# Patient Record
Sex: FEMALE | Race: BLACK OR AFRICAN AMERICAN | NOT HISPANIC OR LATINO | Employment: OTHER | URBAN - METROPOLITAN AREA
[De-identification: names, ages, dates, MRNs, and addresses within clinical notes are randomized per-mention and may not be internally consistent; named-entity substitution may affect disease eponyms.]

---

## 2023-12-07 ENCOUNTER — HOSPITAL ENCOUNTER (INPATIENT)
Facility: HOSPITAL | Age: 78
LOS: 6 days | Discharge: HOME | DRG: 871 | End: 2023-12-13
Attending: EMERGENCY MEDICINE | Admitting: INTERNAL MEDICINE
Payer: MEDICARE

## 2023-12-07 ENCOUNTER — APPOINTMENT (OUTPATIENT)
Dept: GENERAL RADIOLOGY | Facility: HOSPITAL | Age: 78
DRG: 871 | End: 2023-12-07
Payer: MEDICARE

## 2023-12-07 DIAGNOSIS — J96.01 ACUTE HYPOXEMIC RESPIRATORY FAILURE DUE TO COVID-19: ICD-10-CM

## 2023-12-07 DIAGNOSIS — U07.1 ACUTE HYPOXEMIC RESPIRATORY FAILURE DUE TO COVID-19: ICD-10-CM

## 2023-12-07 DIAGNOSIS — R06.89 HYPERCAPNIA: ICD-10-CM

## 2023-12-07 DIAGNOSIS — R53.83 FATIGUE, UNSPECIFIED TYPE: ICD-10-CM

## 2023-12-07 DIAGNOSIS — R09.02 HYPOXIA: ICD-10-CM

## 2023-12-07 DIAGNOSIS — J96.02 ACUTE HYPERCAPNIC RESPIRATORY FAILURE: ICD-10-CM

## 2023-12-07 DIAGNOSIS — U07.1 COVID-19: Primary | ICD-10-CM

## 2023-12-07 PROBLEM — R79.89 ELEVATED BRAIN NATRIURETIC PEPTIDE (BNP) LEVEL: Status: ACTIVE | Noted: 2023-12-07

## 2023-12-07 PROBLEM — E11.9 TYPE 2 DIABETES MELLITUS: Status: ACTIVE | Noted: 2023-12-07

## 2023-12-07 PROBLEM — N17.9 AKI (ACUTE KIDNEY INJURY): Status: ACTIVE | Noted: 2023-12-07

## 2023-12-07 PROBLEM — J96.90 RESPIRATORY FAILURE: Status: ACTIVE | Noted: 2023-12-07

## 2023-12-07 PROBLEM — D64.9 ANEMIA: Chronic | Status: ACTIVE | Noted: 2023-12-07

## 2023-12-07 PROBLEM — J96.90 RESPIRATORY FAILURE: Status: RESOLVED | Noted: 2023-12-07 | Resolved: 2023-12-07

## 2023-12-07 PROBLEM — A41.9 SEPSIS: Status: ACTIVE | Noted: 2023-12-07

## 2023-12-07 LAB
ALBUMIN SERPL-MCNC: 3.7 G/DL (ref 3.5–5.2)
ALBUMIN/GLOB SERPL: 2.1 G/DL
ALP SERPL-CCNC: 84 U/L (ref 39–117)
ALT SERPL W P-5'-P-CCNC: 7 U/L (ref 1–33)
ANION GAP SERPL CALCULATED.3IONS-SCNC: 10 MMOL/L (ref 5–15)
ARTERIAL PATENCY WRIST A: ABNORMAL
ARTERIAL PATENCY WRIST A: ABNORMAL
AST SERPL-CCNC: 18 U/L (ref 1–32)
ATMOSPHERIC PRESS: ABNORMAL MM[HG]
ATMOSPHERIC PRESS: ABNORMAL MM[HG]
B PARAPERT DNA SPEC QL NAA+PROBE: NOT DETECTED
B PERT DNA SPEC QL NAA+PROBE: NOT DETECTED
BASE EXCESS BLDA CALC-SCNC: 10 MMOL/L (ref 0–2)
BASE EXCESS BLDA CALC-SCNC: 10.4 MMOL/L (ref 0–2)
BASOPHILS # BLD AUTO: 0.01 10*3/MM3 (ref 0–0.2)
BASOPHILS NFR BLD AUTO: 0.2 % (ref 0–1.5)
BDY SITE: ABNORMAL
BDY SITE: ABNORMAL
BILIRUB SERPL-MCNC: 0.3 MG/DL (ref 0–1.2)
BODY TEMPERATURE: 37
BODY TEMPERATURE: 37
BUN SERPL-MCNC: 15 MG/DL (ref 8–23)
BUN/CREAT SERPL: 12.3 (ref 7–25)
C PNEUM DNA NPH QL NAA+NON-PROBE: NOT DETECTED
CALCIUM SPEC-SCNC: 9 MG/DL (ref 8.6–10.5)
CHLORIDE SERPL-SCNC: 99 MMOL/L (ref 98–107)
CO2 BLDA-SCNC: 39.6 MMOL/L (ref 22–33)
CO2 BLDA-SCNC: 40 MMOL/L (ref 22–33)
CO2 SERPL-SCNC: 34 MMOL/L (ref 22–29)
COHGB MFR BLD: 1.4 % (ref 0–2)
COHGB MFR BLD: 1.5 % (ref 0–2)
CREAT SERPL-MCNC: 1.22 MG/DL (ref 0.57–1)
CRP SERPL-MCNC: 4.79 MG/DL (ref 0–0.5)
D-LACTATE SERPL-SCNC: 1.1 MMOL/L (ref 0.5–2)
D-LACTATE SERPL-SCNC: 2.3 MMOL/L (ref 0.5–2)
DEPRECATED RDW RBC AUTO: 54.3 FL (ref 37–54)
EGFRCR SERPLBLD CKD-EPI 2021: 45.5 ML/MIN/1.73
EOSINOPHIL # BLD AUTO: 0 10*3/MM3 (ref 0–0.4)
EOSINOPHIL NFR BLD AUTO: 0 % (ref 0.3–6.2)
EPAP: 0
EPAP: 0
ERYTHROCYTE [DISTWIDTH] IN BLOOD BY AUTOMATED COUNT: 14.8 % (ref 12.3–15.4)
FLUAV SUBTYP SPEC NAA+PROBE: NOT DETECTED
FLUBV RNA ISLT QL NAA+PROBE: NOT DETECTED
GEN 5 2HR TROPONIN T REFLEX: 80 NG/L
GLOBULIN UR ELPH-MCNC: 1.8 GM/DL
GLUCOSE BLDC GLUCOMTR-MCNC: 113 MG/DL (ref 70–130)
GLUCOSE SERPL-MCNC: 128 MG/DL (ref 65–99)
HADV DNA SPEC NAA+PROBE: NOT DETECTED
HCO3 BLDA-SCNC: 37.5 MMOL/L (ref 20–26)
HCO3 BLDA-SCNC: 37.8 MMOL/L (ref 20–26)
HCOV 229E RNA SPEC QL NAA+PROBE: NOT DETECTED
HCOV HKU1 RNA SPEC QL NAA+PROBE: NOT DETECTED
HCOV NL63 RNA SPEC QL NAA+PROBE: NOT DETECTED
HCOV OC43 RNA SPEC QL NAA+PROBE: NOT DETECTED
HCT VFR BLD AUTO: 29.5 % (ref 34–46.6)
HCT VFR BLD CALC: 26.3 % (ref 38–51)
HCT VFR BLD CALC: 26.7 % (ref 38–51)
HGB BLD-MCNC: 8.5 G/DL (ref 12–15.9)
HGB BLDA-MCNC: 8.6 G/DL (ref 14–18)
HGB BLDA-MCNC: 8.7 G/DL (ref 14–18)
HMPV RNA NPH QL NAA+NON-PROBE: NOT DETECTED
HOLD SPECIMEN: NORMAL
HPIV1 RNA ISLT QL NAA+PROBE: NOT DETECTED
HPIV2 RNA SPEC QL NAA+PROBE: NOT DETECTED
HPIV3 RNA NPH QL NAA+PROBE: NOT DETECTED
HPIV4 P GENE NPH QL NAA+PROBE: NOT DETECTED
IMM GRANULOCYTES # BLD AUTO: 0.05 10*3/MM3 (ref 0–0.05)
IMM GRANULOCYTES NFR BLD AUTO: 0.8 % (ref 0–0.5)
INHALED O2 CONCENTRATION: 28 %
INHALED O2 CONCENTRATION: 40 %
IPAP: 0
IPAP: 0
LYMPHOCYTES # BLD AUTO: 1.05 10*3/MM3 (ref 0.7–3.1)
LYMPHOCYTES NFR BLD AUTO: 16.6 % (ref 19.6–45.3)
Lab: ABNORMAL
M PNEUMO IGG SER IA-ACNC: NOT DETECTED
MAGNESIUM SERPL-MCNC: 1.7 MG/DL (ref 1.6–2.4)
MCH RBC QN AUTO: 28.8 PG (ref 26.6–33)
MCHC RBC AUTO-ENTMCNC: 28.8 G/DL (ref 31.5–35.7)
MCV RBC AUTO: 100 FL (ref 79–97)
METHGB BLD QL: 0.3 % (ref 0–1.5)
METHGB BLD QL: 0.4 % (ref 0–1.5)
MODALITY: ABNORMAL
MODALITY: ABNORMAL
MONOCYTES # BLD AUTO: 1.13 10*3/MM3 (ref 0.1–0.9)
MONOCYTES NFR BLD AUTO: 17.8 % (ref 5–12)
NEUTROPHILS NFR BLD AUTO: 4.1 10*3/MM3 (ref 1.7–7)
NEUTROPHILS NFR BLD AUTO: 64.6 % (ref 42.7–76)
NOTIFIED BY: ABNORMAL
NOTIFIED WHO: ABNORMAL
NRBC BLD AUTO-RTO: 0 /100 WBC (ref 0–0.2)
NT-PROBNP SERPL-MCNC: 3719 PG/ML (ref 0–1800)
OXYHGB MFR BLDV: 68.9 % (ref 94–99)
OXYHGB MFR BLDV: 97.6 % (ref 94–99)
PAW @ PEAK INSP FLOW SETTING VENT: 0 CMH2O
PAW @ PEAK INSP FLOW SETTING VENT: 0 CMH2O
PCO2 BLDA: 66.8 MM HG (ref 35–45)
PCO2 BLDA: 73 MM HG (ref 35–45)
PCO2 TEMP ADJ BLD: 66.8 MM HG (ref 35–45)
PCO2 TEMP ADJ BLD: 73 MM HG (ref 35–45)
PH BLDA: 7.32 PH UNITS (ref 7.35–7.45)
PH BLDA: 7.36 PH UNITS (ref 7.35–7.45)
PH, TEMP CORRECTED: 7.32 PH UNITS
PH, TEMP CORRECTED: 7.36 PH UNITS
PLATELET # BLD AUTO: 211 10*3/MM3 (ref 140–450)
PMV BLD AUTO: 10.9 FL (ref 6–12)
PO2 BLDA: 119 MM HG (ref 83–108)
PO2 BLDA: 42.4 MM HG (ref 83–108)
PO2 TEMP ADJ BLD: 119 MM HG (ref 83–108)
PO2 TEMP ADJ BLD: 42.4 MM HG (ref 83–108)
POTASSIUM SERPL-SCNC: 4.1 MMOL/L (ref 3.5–5.2)
PROCALCITONIN SERPL-MCNC: 0.23 NG/ML (ref 0–0.25)
PROT SERPL-MCNC: 5.5 G/DL (ref 6–8.5)
RBC # BLD AUTO: 2.95 10*6/MM3 (ref 3.77–5.28)
RHINOVIRUS RNA SPEC NAA+PROBE: NOT DETECTED
RSV RNA NPH QL NAA+NON-PROBE: NOT DETECTED
SARS-COV-2 RNA NPH QL NAA+NON-PROBE: DETECTED
SODIUM SERPL-SCNC: 143 MMOL/L (ref 136–145)
TOTAL RATE: 0 BREATHS/MINUTE
TOTAL RATE: 0 BREATHS/MINUTE
TROPONIN T DELTA: -2 NG/L
TROPONIN T SERPL HS-MCNC: 82 NG/L
WBC NRBC COR # BLD AUTO: 6.34 10*3/MM3 (ref 3.4–10.8)
WHOLE BLOOD HOLD COAG: NORMAL
WHOLE BLOOD HOLD SPECIMEN: NORMAL

## 2023-12-07 PROCEDURE — 36600 WITHDRAWAL OF ARTERIAL BLOOD: CPT

## 2023-12-07 PROCEDURE — 0202U NFCT DS 22 TRGT SARS-COV-2: CPT | Performed by: EMERGENCY MEDICINE

## 2023-12-07 PROCEDURE — 87040 BLOOD CULTURE FOR BACTERIA: CPT | Performed by: EMERGENCY MEDICINE

## 2023-12-07 PROCEDURE — 25010000002 REMDESIVIR 100 MG/20ML SOLUTION 1 EACH VIAL

## 2023-12-07 PROCEDURE — 94799 UNLISTED PULMONARY SVC/PX: CPT

## 2023-12-07 PROCEDURE — 71045 X-RAY EXAM CHEST 1 VIEW: CPT

## 2023-12-07 PROCEDURE — 94660 CPAP INITIATION&MGMT: CPT

## 2023-12-07 PROCEDURE — 83050 HGB METHEMOGLOBIN QUAN: CPT

## 2023-12-07 PROCEDURE — 82948 REAGENT STRIP/BLOOD GLUCOSE: CPT

## 2023-12-07 PROCEDURE — 85025 COMPLETE CBC W/AUTO DIFF WBC: CPT | Performed by: EMERGENCY MEDICINE

## 2023-12-07 PROCEDURE — 82805 BLOOD GASES W/O2 SATURATION: CPT

## 2023-12-07 PROCEDURE — 84484 ASSAY OF TROPONIN QUANT: CPT | Performed by: EMERGENCY MEDICINE

## 2023-12-07 PROCEDURE — 25010000002 FUROSEMIDE PER 20 MG: Performed by: EMERGENCY MEDICINE

## 2023-12-07 PROCEDURE — 25810000003 SODIUM CHLORIDE 0.9 % SOLUTION 250 ML FLEX CONT

## 2023-12-07 PROCEDURE — 99291 CRITICAL CARE FIRST HOUR: CPT | Performed by: INTERNAL MEDICINE

## 2023-12-07 PROCEDURE — XW033E5 INTRODUCTION OF REMDESIVIR ANTI-INFECTIVE INTO PERIPHERAL VEIN, PERCUTANEOUS APPROACH, NEW TECHNOLOGY GROUP 5: ICD-10-PCS | Performed by: INTERNAL MEDICINE

## 2023-12-07 PROCEDURE — 99285 EMERGENCY DEPT VISIT HI MDM: CPT

## 2023-12-07 PROCEDURE — 25010000002 DEXAMETHASONE PER 1 MG: Performed by: INTERNAL MEDICINE

## 2023-12-07 PROCEDURE — 83735 ASSAY OF MAGNESIUM: CPT | Performed by: INTERNAL MEDICINE

## 2023-12-07 PROCEDURE — 83605 ASSAY OF LACTIC ACID: CPT | Performed by: EMERGENCY MEDICINE

## 2023-12-07 PROCEDURE — 93005 ELECTROCARDIOGRAM TRACING: CPT | Performed by: EMERGENCY MEDICINE

## 2023-12-07 PROCEDURE — 86140 C-REACTIVE PROTEIN: CPT | Performed by: INTERNAL MEDICINE

## 2023-12-07 PROCEDURE — 80053 COMPREHEN METABOLIC PANEL: CPT | Performed by: EMERGENCY MEDICINE

## 2023-12-07 PROCEDURE — 84145 PROCALCITONIN (PCT): CPT | Performed by: INTERNAL MEDICINE

## 2023-12-07 PROCEDURE — 82375 ASSAY CARBOXYHB QUANT: CPT

## 2023-12-07 PROCEDURE — 83880 ASSAY OF NATRIURETIC PEPTIDE: CPT | Performed by: EMERGENCY MEDICINE

## 2023-12-07 PROCEDURE — 36415 COLL VENOUS BLD VENIPUNCTURE: CPT

## 2023-12-07 PROCEDURE — 87641 MR-STAPH DNA AMP PROBE: CPT | Performed by: NURSE PRACTITIONER

## 2023-12-07 RX ORDER — GABAPENTIN 300 MG/1
1 CAPSULE ORAL 2 TIMES DAILY
COMMUNITY

## 2023-12-07 RX ORDER — POLYETHYLENE GLYCOL 3350 17 G/17G
17 POWDER, FOR SOLUTION ORAL DAILY PRN
COMMUNITY

## 2023-12-07 RX ORDER — DEXAMETHASONE SODIUM PHOSPHATE 4 MG/ML
6 INJECTION, SOLUTION INTRA-ARTICULAR; INTRALESIONAL; INTRAMUSCULAR; INTRAVENOUS; SOFT TISSUE DAILY
Status: DISCONTINUED | OUTPATIENT
Start: 2023-12-07 | End: 2023-12-13 | Stop reason: HOSPADM

## 2023-12-07 RX ORDER — FUROSEMIDE 40 MG/1
1 TABLET ORAL DAILY
COMMUNITY

## 2023-12-07 RX ORDER — SODIUM CHLORIDE 9 MG/ML
40 INJECTION, SOLUTION INTRAVENOUS AS NEEDED
Status: DISCONTINUED | OUTPATIENT
Start: 2023-12-07 | End: 2023-12-13 | Stop reason: HOSPADM

## 2023-12-07 RX ORDER — LEVOTHYROXINE SODIUM 0.05 MG/1
1 TABLET ORAL DAILY
COMMUNITY

## 2023-12-07 RX ORDER — POLYETHYLENE GLYCOL 3350 17 G/17G
17 POWDER, FOR SOLUTION ORAL DAILY PRN
Status: DISCONTINUED | OUTPATIENT
Start: 2023-12-07 | End: 2023-12-13 | Stop reason: HOSPADM

## 2023-12-07 RX ORDER — GABAPENTIN 300 MG/1
300 CAPSULE ORAL EVERY 12 HOURS SCHEDULED
Status: DISCONTINUED | OUTPATIENT
Start: 2023-12-07 | End: 2023-12-13 | Stop reason: HOSPADM

## 2023-12-07 RX ORDER — IBUPROFEN 600 MG/1
1 TABLET ORAL
Status: DISCONTINUED | OUTPATIENT
Start: 2023-12-07 | End: 2023-12-13 | Stop reason: HOSPADM

## 2023-12-07 RX ORDER — DEXTROSE MONOHYDRATE 25 G/50ML
25 INJECTION, SOLUTION INTRAVENOUS
Status: DISCONTINUED | OUTPATIENT
Start: 2023-12-07 | End: 2023-12-13 | Stop reason: HOSPADM

## 2023-12-07 RX ORDER — LEVOTHYROXINE SODIUM 0.05 MG/1
50 TABLET ORAL
Status: DISCONTINUED | OUTPATIENT
Start: 2023-12-08 | End: 2023-12-13 | Stop reason: HOSPADM

## 2023-12-07 RX ORDER — DOCUSATE SODIUM 100 MG/1
1 CAPSULE, LIQUID FILLED ORAL 2 TIMES DAILY PRN
COMMUNITY

## 2023-12-07 RX ORDER — LISINOPRIL 40 MG/1
1 TABLET ORAL DAILY
COMMUNITY

## 2023-12-07 RX ORDER — SODIUM CHLORIDE 0.9 % (FLUSH) 0.9 %
10 SYRINGE (ML) INJECTION AS NEEDED
Status: DISCONTINUED | OUTPATIENT
Start: 2023-12-07 | End: 2023-12-08

## 2023-12-07 RX ORDER — BISACODYL 10 MG
10 SUPPOSITORY, RECTAL RECTAL DAILY PRN
Status: DISCONTINUED | OUTPATIENT
Start: 2023-12-07 | End: 2023-12-13 | Stop reason: HOSPADM

## 2023-12-07 RX ORDER — SODIUM CHLORIDE 0.9 % (FLUSH) 0.9 %
10 SYRINGE (ML) INJECTION AS NEEDED
Status: DISCONTINUED | OUTPATIENT
Start: 2023-12-07 | End: 2023-12-13 | Stop reason: HOSPADM

## 2023-12-07 RX ORDER — BISACODYL 5 MG/1
5 TABLET, DELAYED RELEASE ORAL DAILY PRN
Status: DISCONTINUED | OUTPATIENT
Start: 2023-12-07 | End: 2023-12-13 | Stop reason: HOSPADM

## 2023-12-07 RX ORDER — FUROSEMIDE 10 MG/ML
80 INJECTION INTRAMUSCULAR; INTRAVENOUS ONCE
Status: COMPLETED | OUTPATIENT
Start: 2023-12-07 | End: 2023-12-07

## 2023-12-07 RX ORDER — ACETAMINOPHEN 325 MG/1
2 TABLET ORAL EVERY 6 HOURS PRN
COMMUNITY

## 2023-12-07 RX ORDER — NYSTATIN 100000 [USP'U]/G
POWDER TOPICAL
COMMUNITY

## 2023-12-07 RX ORDER — AMOXICILLIN 250 MG
2 CAPSULE ORAL 2 TIMES DAILY
Status: DISCONTINUED | OUTPATIENT
Start: 2023-12-07 | End: 2023-12-13 | Stop reason: HOSPADM

## 2023-12-07 RX ORDER — NICOTINE POLACRILEX 4 MG
15 LOZENGE BUCCAL
Status: DISCONTINUED | OUTPATIENT
Start: 2023-12-07 | End: 2023-12-13 | Stop reason: HOSPADM

## 2023-12-07 RX ORDER — AMLODIPINE BESYLATE 10 MG/1
1 TABLET ORAL DAILY
COMMUNITY

## 2023-12-07 RX ORDER — SODIUM CHLORIDE 0.9 % (FLUSH) 0.9 %
10 SYRINGE (ML) INJECTION EVERY 12 HOURS SCHEDULED
Status: DISCONTINUED | OUTPATIENT
Start: 2023-12-07 | End: 2023-12-13 | Stop reason: HOSPADM

## 2023-12-07 RX ORDER — LANOLIN ALCOHOL/MO/W.PET/CERES
1 CREAM (GRAM) TOPICAL NIGHTLY PRN
COMMUNITY

## 2023-12-07 RX ORDER — ATORVASTATIN CALCIUM 20 MG/1
1 TABLET, FILM COATED ORAL DAILY
COMMUNITY

## 2023-12-07 RX ORDER — NITROGLYCERIN 0.4 MG/1
0.4 TABLET SUBLINGUAL
Status: DISCONTINUED | OUTPATIENT
Start: 2023-12-07 | End: 2023-12-13 | Stop reason: HOSPADM

## 2023-12-07 RX ADMIN — FUROSEMIDE 80 MG: 10 INJECTION, SOLUTION INTRAMUSCULAR; INTRAVENOUS at 18:48

## 2023-12-07 RX ADMIN — Medication 10 ML: at 20:59

## 2023-12-07 RX ADMIN — DEXAMETHASONE SODIUM PHOSPHATE 6 MG: 4 INJECTION INTRA-ARTICULAR; INTRALESIONAL; INTRAMUSCULAR; INTRAVENOUS; SOFT TISSUE at 18:48

## 2023-12-07 RX ADMIN — REMDESIVIR 200 MG: 100 INJECTION, POWDER, LYOPHILIZED, FOR SOLUTION INTRAVENOUS at 22:47

## 2023-12-07 NOTE — Clinical Note
Level of Care: Critical Care [6]   Diagnosis: Respiratory failure [282127]   Certification: I Certify That Inpatient Hospital Services Are Medically Necessary For Greater Than 2 Midnights

## 2023-12-07 NOTE — ED PROVIDER NOTES
Subjective   History of Present Illness  78-year-old female who presents for evaluation of weakness, fever, and low oxygen saturations.  The patient reportedly was noted to be more confused and weak than her typical baseline today.  Presents from the [facility] [place].  She typically ambulates on her own but today she required 2 people to assist her and could barely stand.  She reports feeling much weaker than her typical self and exhibits baseline mentation but appears lethargic.  She primarily complains of feeling weak but truly denies shortness of breath.  She denies chest pain or abdominal pain.  She does report that several of the nursing staff at the facility have been sick with COVID-19 and she was diagnosed as being positive for COVID-19 today.  At 8:30 AM this morning she had a temperature of 100.6, then at 1240 she had a fever greater than 102.  She was given 1 g of Tylenol that given time.  She has been delivered here due to low oxygen saturations and worsening fatigue.  She denies dysuria, frequency, urgency.  No change in bowel function clued no diarrhea or blood in stool.  No other acute complaints.      Review of Systems   Constitutional:  Positive for activity change, appetite change, chills, diaphoresis and fever. Negative for fatigue.   HENT:  Negative for congestion, ear pain, postnasal drip, sinus pressure and sore throat.    Eyes:  Negative for pain, redness and visual disturbance.   Respiratory:  Negative for cough, chest tightness and shortness of breath.    Cardiovascular:  Negative for chest pain, palpitations and leg swelling.   Gastrointestinal:  Negative for abdominal pain, anal bleeding, blood in stool, diarrhea, nausea and vomiting.   Endocrine: Negative for polydipsia and polyuria.   Genitourinary:  Negative for difficulty urinating, dysuria, frequency and urgency.   Musculoskeletal:  Negative for arthralgias, back pain and neck pain.   Skin:  Negative for pallor and rash.    Allergic/Immunologic: Negative for environmental allergies and immunocompromised state.   Neurological:  Positive for weakness (generalized). Negative for dizziness and headaches.   Hematological:  Negative for adenopathy.   Psychiatric/Behavioral:  Negative for confusion, self-injury and suicidal ideas. The patient is not nervous/anxious.    All other systems reviewed and are negative.      Past Medical History:   Diagnosis Date    Diabetes mellitus     Hypertension     Ischemia of small intestine        Allergies   Allergen Reactions    Penicillins Other (See Comments)     UNKNOWN       History reviewed. No pertinent surgical history.    History reviewed. No pertinent family history.    Social History     Socioeconomic History    Marital status:    Tobacco Use    Smoking status: Never     Passive exposure: Never    Smokeless tobacco: Never   Vaping Use    Vaping Use: Never used   Substance and Sexual Activity    Alcohol use: Never    Drug use: Never    Sexual activity: Not Currently           Objective   Physical Exam  Vitals and nursing note reviewed.   Constitutional:       General: She is in acute distress.      Appearance: Normal appearance. She is well-developed. She is ill-appearing. She is not toxic-appearing or diaphoretic.   HENT:      Head: Normocephalic and atraumatic.      Right Ear: External ear normal.      Left Ear: External ear normal.      Nose: Nose normal.   Eyes:      General: Lids are normal.      Pupils: Pupils are equal, round, and reactive to light.   Neck:      Trachea: No tracheal deviation.   Cardiovascular:      Rate and Rhythm: Normal rate and regular rhythm.      Pulses: No decreased pulses.      Heart sounds: Normal heart sounds. No murmur heard.     No friction rub. No gallop.   Pulmonary:      Effort: Tachypnea and respiratory distress present.      Breath sounds: Normal breath sounds. Examination of the right-lower field reveals rales. Examination of the left-lower field  reveals rales. No decreased breath sounds, wheezing, rhonchi or rales.   Abdominal:      General: Bowel sounds are normal.      Palpations: Abdomen is soft.      Tenderness: There is no abdominal tenderness. There is no guarding or rebound.   Musculoskeletal:         General: No deformity. Normal range of motion.      Cervical back: Normal range of motion and neck supple.      Right lower le+ Pitting Edema present.      Left lower leg: Pitting Edema present.   Lymphadenopathy:      Cervical: No cervical adenopathy.   Skin:     General: Skin is warm and dry.      Findings: No rash.   Neurological:      Mental Status: She is alert and oriented to person, place, and time.      Cranial Nerves: No cranial nerve deficit.      Sensory: No sensory deficit.   Psychiatric:         Speech: Speech normal.         Behavior: Behavior normal.         Thought Content: Thought content normal.         Judgment: Judgment normal.         Critical Care    Performed by: Nicky Smith MD  Authorized by: Nicky Smith MD    Critical care provider statement:     Critical care time (minutes):  45    Critical care time was exclusive of:  Separately billable procedures and treating other patients    Critical care was necessary to treat or prevent imminent or life-threatening deterioration of the following conditions:  Respiratory failure and CNS failure or compromise    Critical care was time spent personally by me on the following activities:  Development of treatment plan with patient or surrogate, discussions with consultants, evaluation of patient's response to treatment, examination of patient, obtaining history from patient or surrogate, ordering and performing treatments and interventions, ordering and review of laboratory studies, ordering and review of radiographic studies, pulse oximetry, re-evaluation of patient's condition and review of old charts    I assumed direction of critical care for this patient from  another provider in my specialty: no      Care discussed with: admitting provider               ED Course                                             Medical Decision Making  Differential diagnosis includes COPD exacerbation, CHF exacerbation, viral illness, pneumonia, hypercapnia, pulmonary embolism, other unspecified etiology.    Lab evaluation shows an elevated troponin with this was trending down on recheck.    Labs also show normal white count, baseline H&H, nonconcerning kidney function with no significant electrolyte abnormalities.    The patient does have an elevated BNP and there may be a component of CHF exacerbation in addition to underlying known COVID-19.  Viral respiratory panel was positive for COVID-19.    Initial ABG showed a pH of 7.32 with a pCO2 of 73.  The respiratory therapist did report the concern for a mixed gas.  Repeat ABG after BiPAP had been applied for greater than 1 hour showed a pH of 7.35, within normal range, with a pCO2 elevated at 66.    Despite an ABG seems to be improving the patient's conversation was initiated to carry on a conversation for evaluation she is more difficult to arouse and requires allow without stimulus.    Because of this change in mentation the hospital service expresses concern about admission to the floor which I feel is reasonable and appropriate.  I discussed the patient with the intensivist, Dr. Klein.  Dr. Klein evaluated the patient in the ER and will admit for further care.    The patient has been given Lasix here, BiPAP applied, and Decadron and remdesivir ordered by the hospitalist.    Problems Addressed:  COVID-19: complicated acute illness or injury with systemic symptoms that poses a threat to life or bodily functions  Fatigue, unspecified type: complicated acute illness or injury with systemic symptoms  Hypercapnia: complicated acute illness or injury with systemic symptoms that poses a threat to life or bodily functions  Hypoxia: complicated  acute illness or injury with systemic symptoms that poses a threat to life or bodily functions    Amount and/or Complexity of Data Reviewed  Independent Historian: EMS  External Data Reviewed: labs, radiology, ECG and notes.  Labs: ordered. Decision-making details documented in ED Course.  Radiology: ordered and independent interpretation performed. Decision-making details documented in ED Course.  ECG/medicine tests: ordered and independent interpretation performed. Decision-making details documented in ED Course.     Details: EKG independently interpreted by myself shows normal sinus rhythm with left anterior fascicular block and right bundle branch block.    Risk  Prescription drug management.  Decision regarding hospitalization.        Final diagnoses:   COVID-19   Hypoxia   Fatigue, unspecified type   Hypercapnia       ED Disposition  ED Disposition       ED Disposition   Decision to Admit    Condition   --    Comment   Level of Care: Critical Care [6]   Admitting Physician: AMALIA MATTHEW [[id number]]                 No follow-up provider specified.       Medication List      No changes were made to your prescriptions during this visit.            Nicky Smith MD  12/08/23 2052

## 2023-12-08 LAB
ALBUMIN SERPL-MCNC: 3.5 G/DL (ref 3.5–5.2)
ALBUMIN/GLOB SERPL: 1.3 G/DL
ALP SERPL-CCNC: 90 U/L (ref 39–117)
ALT SERPL W P-5'-P-CCNC: 8 U/L (ref 1–33)
ANION GAP SERPL CALCULATED.3IONS-SCNC: 10 MMOL/L (ref 5–15)
ARTERIAL PATENCY WRIST A: ABNORMAL
ARTERIAL PATENCY WRIST A: POSITIVE
AST SERPL-CCNC: 15 U/L (ref 1–32)
ATMOSPHERIC PRESS: ABNORMAL MM[HG]
ATMOSPHERIC PRESS: ABNORMAL MM[HG]
BASE EXCESS BLDA CALC-SCNC: 9.4 MMOL/L (ref 0–2)
BASE EXCESS BLDA CALC-SCNC: 9.7 MMOL/L (ref 0–2)
BDY SITE: ABNORMAL
BDY SITE: ABNORMAL
BILIRUB SERPL-MCNC: 0.3 MG/DL (ref 0–1.2)
BODY TEMPERATURE: 37
BODY TEMPERATURE: 37
BUN SERPL-MCNC: 18 MG/DL (ref 8–23)
BUN/CREAT SERPL: 17.1 (ref 7–25)
CALCIUM SPEC-SCNC: 9 MG/DL (ref 8.6–10.5)
CHLORIDE SERPL-SCNC: 101 MMOL/L (ref 98–107)
CO2 BLDA-SCNC: 39.7 MMOL/L (ref 22–33)
CO2 BLDA-SCNC: 39.9 MMOL/L (ref 22–33)
CO2 SERPL-SCNC: 33 MMOL/L (ref 22–29)
COHGB MFR BLD: 1.4 % (ref 0–2)
COHGB MFR BLD: 1.5 % (ref 0–2)
CREAT SERPL-MCNC: 1.05 MG/DL (ref 0.57–1)
EGFRCR SERPLBLD CKD-EPI 2021: 54.5 ML/MIN/1.73
EPAP: 0
EPAP: 6
FERRITIN SERPL-MCNC: 101.3 NG/ML (ref 13–150)
GLOBULIN UR ELPH-MCNC: 2.8 GM/DL
GLUCOSE BLDC GLUCOMTR-MCNC: 125 MG/DL (ref 70–130)
GLUCOSE BLDC GLUCOMTR-MCNC: 137 MG/DL (ref 70–130)
GLUCOSE BLDC GLUCOMTR-MCNC: 151 MG/DL (ref 70–130)
GLUCOSE BLDC GLUCOMTR-MCNC: 154 MG/DL (ref 70–130)
GLUCOSE BLDC GLUCOMTR-MCNC: 232 MG/DL (ref 70–130)
GLUCOSE SERPL-MCNC: 152 MG/DL (ref 65–99)
HBA1C MFR BLD: 5.5 % (ref 4.8–5.6)
HCO3 BLDA-SCNC: 37.5 MMOL/L (ref 20–26)
HCO3 BLDA-SCNC: 37.6 MMOL/L (ref 20–26)
HCT VFR BLD CALC: 27.4 % (ref 38–51)
HCT VFR BLD CALC: 28 % (ref 38–51)
HGB BLDA-MCNC: 8.9 G/DL (ref 14–18)
HGB BLDA-MCNC: 9.2 G/DL (ref 14–18)
INHALED O2 CONCENTRATION: 45 %
INHALED O2 CONCENTRATION: 50 %
IPAP: 0
IPAP: 18
IRON 24H UR-MRATE: 19 MCG/DL (ref 37–145)
IRON SATN MFR SERPL: 6 % (ref 20–50)
LDH SERPL-CCNC: 245 U/L (ref 135–214)
Lab: ABNORMAL
Lab: ABNORMAL
METHGB BLD QL: 0.5 % (ref 0–1.5)
METHGB BLD QL: 0.6 % (ref 0–1.5)
MODALITY: ABNORMAL
MODALITY: ABNORMAL
MRSA DNA SPEC QL NAA+PROBE: NEGATIVE
NOTIFIED BY: ABNORMAL
NOTIFIED BY: ABNORMAL
NOTIFIED WHO: ABNORMAL
NOTIFIED WHO: ABNORMAL
OXYHGB MFR BLDV: 97 % (ref 94–99)
OXYHGB MFR BLDV: 97.7 % (ref 94–99)
PAW @ PEAK INSP FLOW SETTING VENT: 0 CMH2O
PAW @ PEAK INSP FLOW SETTING VENT: 0 CMH2O
PCO2 BLDA: 71.9 MM HG (ref 35–45)
PCO2 BLDA: 75.5 MM HG (ref 35–45)
PCO2 TEMP ADJ BLD: 71.9 MM HG (ref 35–45)
PCO2 TEMP ADJ BLD: 75.5 MM HG (ref 35–45)
PH BLDA: 7.3 PH UNITS (ref 7.35–7.45)
PH BLDA: 7.33 PH UNITS (ref 7.35–7.45)
PH, TEMP CORRECTED: 7.3 PH UNITS
PH, TEMP CORRECTED: 7.33 PH UNITS
PO2 BLDA: 118 MM HG (ref 83–108)
PO2 BLDA: 134 MM HG (ref 83–108)
PO2 TEMP ADJ BLD: 118 MM HG (ref 83–108)
PO2 TEMP ADJ BLD: 134 MM HG (ref 83–108)
POTASSIUM SERPL-SCNC: 4.2 MMOL/L (ref 3.5–5.2)
PROT SERPL-MCNC: 6.3 G/DL (ref 6–8.5)
SODIUM SERPL-SCNC: 144 MMOL/L (ref 136–145)
T4 FREE SERPL-MCNC: 1.1 NG/DL (ref 0.93–1.7)
TIBC SERPL-MCNC: 337 MCG/DL (ref 298–536)
TOTAL RATE: 0 BREATHS/MINUTE
TOTAL RATE: 0 BREATHS/MINUTE
TRANSFERRIN SERPL-MCNC: 226 MG/DL (ref 200–360)
TROPONIN T SERPL HS-MCNC: 58 NG/L
TSH SERPL DL<=0.05 MIU/L-ACNC: 0.42 UIU/ML (ref 0.27–4.2)
VENTILATOR MODE: ABNORMAL
VENTILATOR MODE: ABNORMAL

## 2023-12-08 PROCEDURE — 36600 WITHDRAWAL OF ARTERIAL BLOOD: CPT

## 2023-12-08 PROCEDURE — 83615 LACTATE (LD) (LDH) ENZYME: CPT | Performed by: INTERNAL MEDICINE

## 2023-12-08 PROCEDURE — 25010000002 ENOXAPARIN PER 10 MG: Performed by: INTERNAL MEDICINE

## 2023-12-08 PROCEDURE — 25010000002 REMDESIVIR 100 MG/20ML SOLUTION 1 EACH VIAL

## 2023-12-08 PROCEDURE — 82728 ASSAY OF FERRITIN: CPT | Performed by: INTERNAL MEDICINE

## 2023-12-08 PROCEDURE — 82948 REAGENT STRIP/BLOOD GLUCOSE: CPT

## 2023-12-08 PROCEDURE — 83050 HGB METHEMOGLOBIN QUAN: CPT

## 2023-12-08 PROCEDURE — 83036 HEMOGLOBIN GLYCOSYLATED A1C: CPT | Performed by: INTERNAL MEDICINE

## 2023-12-08 PROCEDURE — 84484 ASSAY OF TROPONIN QUANT: CPT | Performed by: INTERNAL MEDICINE

## 2023-12-08 PROCEDURE — 99291 CRITICAL CARE FIRST HOUR: CPT | Performed by: INTERNAL MEDICINE

## 2023-12-08 PROCEDURE — 80053 COMPREHEN METABOLIC PANEL: CPT | Performed by: INTERNAL MEDICINE

## 2023-12-08 PROCEDURE — 82375 ASSAY CARBOXYHB QUANT: CPT

## 2023-12-08 PROCEDURE — 83540 ASSAY OF IRON: CPT | Performed by: INTERNAL MEDICINE

## 2023-12-08 PROCEDURE — 84439 ASSAY OF FREE THYROXINE: CPT | Performed by: INTERNAL MEDICINE

## 2023-12-08 PROCEDURE — 94799 UNLISTED PULMONARY SVC/PX: CPT

## 2023-12-08 PROCEDURE — 25810000003 SODIUM CHLORIDE 0.9 % SOLUTION 250 ML FLEX CONT

## 2023-12-08 PROCEDURE — 82805 BLOOD GASES W/O2 SATURATION: CPT

## 2023-12-08 PROCEDURE — 84443 ASSAY THYROID STIM HORMONE: CPT | Performed by: INTERNAL MEDICINE

## 2023-12-08 PROCEDURE — 25010000002 DEXAMETHASONE PER 1 MG: Performed by: INTERNAL MEDICINE

## 2023-12-08 PROCEDURE — 84466 ASSAY OF TRANSFERRIN: CPT | Performed by: INTERNAL MEDICINE

## 2023-12-08 PROCEDURE — 63710000001 INSULIN REGULAR HUMAN PER 5 UNITS: Performed by: NURSE PRACTITIONER

## 2023-12-08 RX ORDER — ENOXAPARIN SODIUM 100 MG/ML
40 INJECTION SUBCUTANEOUS EVERY 12 HOURS SCHEDULED
Status: DISCONTINUED | OUTPATIENT
Start: 2023-12-08 | End: 2023-12-13 | Stop reason: HOSPADM

## 2023-12-08 RX ORDER — FUROSEMIDE 40 MG/1
40 TABLET ORAL DAILY
Status: DISCONTINUED | OUTPATIENT
Start: 2023-12-08 | End: 2023-12-13 | Stop reason: HOSPADM

## 2023-12-08 RX ORDER — ENOXAPARIN SODIUM 100 MG/ML
40 INJECTION SUBCUTANEOUS EVERY 12 HOURS SCHEDULED
Status: DISCONTINUED | OUTPATIENT
Start: 2023-12-08 | End: 2023-12-08

## 2023-12-08 RX ADMIN — SENNOSIDES AND DOCUSATE SODIUM 2 TABLET: 8.6; 5 TABLET ORAL at 08:31

## 2023-12-08 RX ADMIN — ENOXAPARIN SODIUM 40 MG: 100 INJECTION SUBCUTANEOUS at 10:54

## 2023-12-08 RX ADMIN — INSULIN HUMAN 2 UNITS: 100 INJECTION, SOLUTION PARENTERAL at 00:38

## 2023-12-08 RX ADMIN — FUROSEMIDE 40 MG: 40 TABLET ORAL at 10:54

## 2023-12-08 RX ADMIN — GABAPENTIN 300 MG: 300 CAPSULE ORAL at 20:50

## 2023-12-08 RX ADMIN — DEXAMETHASONE SODIUM PHOSPHATE 6 MG: 4 INJECTION INTRA-ARTICULAR; INTRALESIONAL; INTRAMUSCULAR; INTRAVENOUS; SOFT TISSUE at 08:31

## 2023-12-08 RX ADMIN — REMDESIVIR 100 MG: 100 INJECTION, POWDER, LYOPHILIZED, FOR SOLUTION INTRAVENOUS at 18:00

## 2023-12-08 RX ADMIN — INSULIN HUMAN 3 UNITS: 100 INJECTION, SOLUTION PARENTERAL at 12:39

## 2023-12-08 RX ADMIN — Medication 10 ML: at 20:50

## 2023-12-08 RX ADMIN — Medication 10 ML: at 08:31

## 2023-12-08 RX ADMIN — LEVOTHYROXINE SODIUM 50 MCG: 0.05 TABLET ORAL at 05:50

## 2023-12-08 RX ADMIN — ENOXAPARIN SODIUM 40 MG: 100 INJECTION SUBCUTANEOUS at 20:50

## 2023-12-08 RX ADMIN — INSULIN HUMAN 2 UNITS: 100 INJECTION, SOLUTION PARENTERAL at 05:50

## 2023-12-08 RX ADMIN — GABAPENTIN 300 MG: 300 CAPSULE ORAL at 08:30

## 2023-12-08 NOTE — PROGRESS NOTES
Pulmonary/Critical Care Follow-up     LOS: 1 day   Patient Care Team:  Cami Gonzalez MD as PCP - General (Internal Medicine)    Chief Complaint: COVID-pneumonia      Subjective     History reviewed and updated in EMR as indicated    Interval History:     Patient was maintained on BiPAP overnight.  Currently on a regular nasal cannula and she denies dyspnea.  I decreased her supplemental oxygen down to 3 L as she was satting upper 90s when I saw her.  No fevers or chills.    History taken from: Patient, staff    PMH/FH/Social History were reviewed and updated appropriately in the electronic medical record.     Review of Systems:    Review of 14 systems was completed with positives and pertinent negatives noted in the subjective section.  All other systems reviewed and are negative.         Objective     Vital Signs  Temp:  [97.5 °F (36.4 °C)-98.9 °F (37.2 °C)] 97.5 °F (36.4 °C)  Heart Rate:  [55-73] 65  Resp:  [18-22] 19  BP: ()/(43-99) 99/46  12/07 0701 - 12/08 0700  In: 309.6 [I.V.:19.6]  Out: 400 [Urine:400]  Body mass index is 38.11 kg/m².     IV drips:  Pharmacy Consult - Remdesivir       Physical Exam:     Constitutional:   Alert, in no acute distress   Head:   Normocephalic, atraumatic   Eyes:           Lids and lashes normal, conjunctivae and sclerae normal.  PER   ENMT:  Ears appear intact with no abnormalities noted     Lips normal.     Neck:  Trachea midline, no JVD   Lungs/Resp:    Normal effort, symmetric chest rise, no crepitus, clear to      auscultation bilaterally.               Heart/CV:   Regular rhythm and normal rate, no murmur   Abdomen/GI:    Soft, nontender, nondistended   :    Deferred   Extremities/MSK:  No clubbing or cyanosis.  No edema.     Pulses:  Pulses palpable and equal bilaterally   Skin:  No bleeding, bruising or rash   Heme/Lymph:  No cervical or supraclavicular adenopathy.   Neurologic:    Psychiatric:    Moves all extremities with no obvious focal motor deficit.   Cranial nerves 2 - 12 grossly intact  Non-agitated, normal affect.    The above physical exam findings were reviewed and reflect my exam findings as of today's exam.   Electronically signed by:  Jose Manuel Osullivan MD  12/08/23  08:53 EST      Results Review:     I reviewed the patient's new clinical results.   Results from last 7 days   Lab Units 12/08/23  0355 12/07/23  1541   SODIUM mmol/L 144 143   POTASSIUM mmol/L 4.2 4.1   CHLORIDE mmol/L 101 99   CO2 mmol/L 33.0* 34.0*   BUN mg/dL 18 15   CREATININE mg/dL 1.05* 1.22*   CALCIUM mg/dL 9.0 9.0   BILIRUBIN mg/dL 0.3 0.3   ALK PHOS U/L 90 84   ALT (SGPT) U/L 8 7   AST (SGOT) U/L 15 18   GLUCOSE mg/dL 152* 128*     Results from last 7 days   Lab Units 12/07/23  1636   WBC 10*3/mm3 6.34   HEMOGLOBIN g/dL 8.5*   HEMATOCRIT % 29.5*   PLATELETS 10*3/mm3 211     Results from last 7 days   Lab Units 12/08/23  0719   PH, ARTERIAL pH units 7.325*   PO2 ART mm Hg 134.0*   PCO2, ARTERIAL mm Hg 71.9*   HCO3 ART mmol/L 37.5*     Results from last 7 days   Lab Units 12/07/23  1541   MAGNESIUM mg/dL 1.7       I reviewed the patient's new imaging including images and reports.    Medication Review:   dexAMETHasone, 6 mg, Oral, Daily   Or  dexAMETHasone, 6 mg, Intravenous, Daily  gabapentin, 300 mg, Oral, Q12H  insulin regular, 2-7 Units, Subcutaneous, Q6H  levothyroxine, 50 mcg, Oral, Q AM  remdesivir, 100 mg, Intravenous, Q24H  senna-docusate sodium, 2 tablet, Oral, BID  sodium chloride, 10 mL, Intravenous, Q12H      Pharmacy Consult - Remdesivir,         Assessment & Plan         Acute hypercapnic respiratory failure    COVID-19 virus detected    Elevated brain natriuretic peptide (BNP) level    FRAN (acute kidney injury)    Sepsis    Type 2 diabetes mellitus    Anemia    Acute hypoxemic respiratory failure due to COVID-19    78 y.o. with history of HTN, T2DM, recent small intestinal ischemia presented to [facility] ED on 12/7/2023 with weakness, fever, and hypoxemia.  The patient lives  at the [facility] [facility] [place] and was found to have increased confusion and weakness today.  Temperature was 100.6 later measured at greater than 102 Fahrenheit.  Also was found to be hypoxemic and she was transported to the hospital for further evaluation.    Patient had combined hypoxemic and hypercapnic respiratory failure.  She has required oxygen and has generally been maintained with a normal oxygen saturation on supplemental oxygen but does have a documented oxygen saturation of 87% on supplemental oxygen meeting the criteria for hypoxemic respiratory failure.  ABG initially had a pCO2 of 73 with a pH of 7.32 consistent with hypercapnic respiratory failure.    Patient was admitted to the ICU on BiPAP for respiratory failure.  She has been treated with remdesivir and dexamethasone for COVID-pneumonia.    Plan:  1.  For COVID-pneumonia: Dexamethasone.  Remdesivir.  2.  For acute hypoxemic and hypercapnic respiratory failure secondary to COVID-pneumonia: BiPAP as needed.  Currently on nasal cannula oxygen.  Repeat ABG at this point if symptomatic.  3.  For hypertension: Currently normotensive.  Hold antihypertensives for now.  4.  For anemia: Improved from prior admission at [facility].  Monitor.  5.  For hypothyroidism: Levothyroxine.  6.  For FRAN: Improving.  Monitor.  Avoid nephrotoxins if possible and renally dose medications if appropriate.  7.  For diabetes: Correction insulin for now.  8.  For nutrition: Advance diet.  9.  For neuropathy: Continue gabapentin.    Patient is critically ill secondary to respiratory failure due to COVID-pneumonia with tenuous respiratory status in the setting of acute mixed respiratory failure and has high risk of life-threatening decompensation in condition.   As such, the patient requires continuous monitoring and frequent reassessment for consideration of adjustment in management to minimize this risk.  I personally reassessed the patient multiple times today.    Critical care  time : 40 minutes spent by me personally and independently.(This excludes time spent performing separately reportable procedures and services).  Critical care time includes high complexity decision making to assess, manipulate, and support vital organ system failure in this individual who has impairment of one or more vital organ systems such that there is a high probability of imminent or life threatening deterioration in the patient’s condition.    Electronically signed by:    Jose Manuel Osullivan MD  12/08/23  08:53 EST      *. Please note that portions of this note were completed with TCHO - a voice recognition program.

## 2023-12-08 NOTE — PLAN OF CARE
Goal Outcome Evaluation:  Pt arrived to unit from ED for hypercapnic respiratory failure d/t Covid-19 at 2020. Pt VSS on 50% BiPAP. Afebrile. No C/O pain. Pt responds to voice, GCS 14.  mL with 1 medium unmeasured occ upon arrival. Pt morning ABG CO2 critical at 75.5, repeat ordered after BiPAP volume increased.

## 2023-12-08 NOTE — PLAN OF CARE
Goal Outcome Evaluation:           Progress: improving  -pt transitioned to NC from BiPap this AM, now on 3L NC; VS tolerating, pt remained A&Ox4  -diet started and tolerated well  -home lasix reordered; UOP 550ml; no BM  -pt's daughter updated by phone

## 2023-12-08 NOTE — CASE MANAGEMENT/SOCIAL WORK
Discharge Planning Assessment  [facility]     Patient Name: Mathew Crabtree  MRN: [id number]  Today's Date: 12/8/2023    Admit Date: 12/7/2023    Plan: Unknown/Ongoing   Discharge Needs Assessment       Row Name 12/08/23 1435       Living Environment    People in Home alone    Current Living Arrangements home    Primary Care Provided by self    Provides Primary Care For no one    Family Caregiver if Needed child(nita), adult;sibling(s)       Transition Planning    Patient/Family Anticipates Transition to home    Transportation Anticipated family or friend will provide       Discharge Needs Assessment    Readmission Within the Last 30 Days no previous admission in last 30 days    Equipment Currently Used at Home cane, straight    Current Discharge Risk lives alone                   Discharge Plan       Row Name 12/08/23 1436       Plan    Plan Unknown/Ongoing    Patient/Family in Agreement with Plan yes    Plan Comments I have attempted to speak with Ms. Crabtree's daughter, Helga, on the phone but have been unable to reach her. I have left a voicemail and will await her call. I have reviewed Ms. Crabtree's chart and the following information is from that review. Ms. Crabtree lives alone in [facility]. She is independent with mobility and activities of daily living. She drives herself when leaving the home and is not current with any home or outpatient services. She has a straight cane at home she uses occasionally. Denies any difficulty affording her medications. She was recently hospitalized at [facility] and went to rehab at [facility] until her admission here. She will likely need to go to rehab again and she has used all 20 of her rehab days and will be in to her copay days. These copay days will likely cost her about $200 a day. Unknown discharge plan at this time. Will await her daughter to call me back to confirm information. CM continues to follow.    Final Discharge Disposition Code 30 - still  a patient                  Continued Care and Services - Admitted Since 12/7/2023    Coordination has not been started for this encounter.       Expected Discharge Date and Time       Expected Discharge Date Expected Discharge Time    Dec 15, 2023            Demographic Summary       Row Name 12/08/23 1431       General Information    General Information Comments Confirmed PCP to be Cami Gonzalez and [employer] to be insurer.                   Functional Status       Row Name 12/08/23 1435       Functional Status, IADL    Medications independent    Meal Preparation independent    Housekeeping independent    Laundry independent    Shopping independent       Employment/    Employment Status retired                   Psychosocial    No documentation.                  Abuse/Neglect    No documentation.                  Legal    No documentation.                  Substance Abuse    No documentation.                  Patient Forms    No documentation.                     Shola Adhikari RN

## 2023-12-08 NOTE — H&P
ICU ADMISSION NOTE    Chief complaint Weakness, Fever and Hypoxia    Subjective     Patient is a 78 y.o. female PMH Diabetes, HTN and ischemia of the small intestines who presents to the [facility] ED 12/7/23 for weakness, fever and low oxygenation. Patient lives at the [facility] where today, she was found to be more confused and weaker than baseline. At 830 this morning she had a temperature of 100.6 and then around 1230 fever was greater than 102. She was given 1g of Tylenol. ON evaluation shew as found to be hypoxic. With worsening fatigue, hypoxia and fever the decision to bring her to the hospital was made. EMS was called and she was brought to the ED for evaluation.    VS on arrival: temp 97.9, HR 63, RR 22, /51, SpO2 89%. Significant labs: BNP 3719, ABG 7.32 / 73 / 42.4 / 37.8 (on NC), glucose 128, BUN 15, Creat 1.22, Na 143, K 4.1, AST 18, ALT 7, Alk phos 84, bili 0.3, lactate 2.3, Wbc 6.34, Hgb 8.5 Plts 211, COVID 19 Positive, mag 1.7, CRP 4.79, PCT 0.23, troponin 82.     While in the ED she was put on Biap. Repeat ABG 7.35 / 66.8 / 119 / 37.5. She was given Remdesivir, Lasix and Dexamethasone. With her respiratory failure she is being admitted to the ICU for management.     Of note patient was admitted to [facility] from 10/21-11/16. She presented with diffuse abdominal pain, diarrhea with mucous, nausea and vomiting over a week with decreased PO intake. Workup revealed a leukocytosis, FRAN, lactic acidosis. CT a/p showed extensive portal venous gas out of proportion to pneumatosis concerning for ischemic or necrotic bowel. Abnormally dilated loops of small bowel with bacterial overgrowth and associated swirling of the mesentery concerning for internal hernia. She was emergently taken to the OR where she underwent ex-lap with lysis of adhesions. She had no necrotic bowel needing resection. Post op she developed ATN/FRAN and ultimately required iHD. Last session was on 11/7 and HD catheter was  removed on 11/13. She also had a SBO requiring TPN. On 11/4 she had a worsening sepsis requiring broadening of her antibiotics. CT scan of her A/P showed 5.5 fluid collection thought to not likely represent an abscess. She was also found to have a pneumonia and worsening respiratory status requiring reintubation on 11/6. She was extubated on 11/9 and her respiratory status remained stable on room air. Antibiotics were stoppe don 11/13 and she remained stable without leukocytosis or fever. She was evaluated by PT/OT who found her to be a good candidate for continued hterapy for weakness. She was discharged to the [facility].     I spent 10 minutes of time on this.  Neyda Reaves, MSN, AGACNP-BC, APRN    Electronically signed by LISA Martinez, 12/07/23, 7:16 PM EST.    Patient had ischemic colitis and surgery October 21 at [facility].  Hospital course was complicated by right lower and middle lobe pneumonia with respiratory failure requiring mechanical ventilation, acute tubular necrosis requiring hemodialysis.  She was discharged to the [facility] for rehabilitation.  At discharge her hemoglobin was 7.8.  During that hospital stay echocardiogram revealed an EF of 70%.  She has underlying diabetes mellitus type 2 with neuropathy.  She takes metformin.  She also has hypertension and was taking metoprolol, lisinopril, amlodipine.  At discharge her hemoglobin was 7.8.  She now presents with weakness for several days and fever.  She swab positive for COVID.  She got her original vaccines but did not get her booster this fall.  She does have a productive cough.  She does not have any underlying lung disease and has never smoked.      Review of Systems  Review of Systems   Constitutional:  Positive for activity change, appetite change, chills, fatigue and fever.   HENT:  Positive for congestion.    Eyes:  Negative for visual disturbance.   Respiratory:  Positive for cough and shortness of breath.    Cardiovascular:   Negative for chest pain.   Gastrointestinal:  Negative for abdominal pain.   Endocrine: Positive for cold intolerance.   Genitourinary:  Negative for difficulty urinating.   Musculoskeletal:  Positive for arthralgias.   Skin:  Negative for rash.   Allergic/Immunologic: Negative.    Neurological:  Positive for light-headedness and numbness.   Hematological:  Does not bruise/bleed easily.   Psychiatric/Behavioral: Negative.          Home Medications  Medications Prior to Admission   Medication Sig Dispense Refill Last Dose    acetaminophen (TYLENOL) 325 MG tablet Take 2 tablets by mouth Every 6 (Six) Hours As Needed for Mild Pain.   Past Week    amLODIPine (NORVASC) 10 MG tablet Take 1 tablet by mouth Daily.   12/6/2023    atorvastatin (LIPITOR) 20 MG tablet Take 1 tablet by mouth Daily.   12/6/2023    docusate sodium (COLACE) 100 MG capsule Take 1 capsule by mouth 2 (Two) Times a Day As Needed for Constipation.   Past Week    furosemide (LASIX) 40 MG tablet Take 1 tablet by mouth Daily.   12/6/2023    gabapentin (NEURONTIN) 300 MG capsule Take 1 capsule by mouth 2 (Two) Times a Day.   12/6/2023    levothyroxine (SYNTHROID, LEVOTHROID) 50 MCG tablet Take 1 tablet by mouth Daily.   12/6/2023    lisinopril (PRINIVIL,ZESTRIL) 40 MG tablet Take 1 tablet by mouth Daily.   12/6/2023    melatonin 3 MG tablet 1 tablet At Night As Needed for Sleep.   Past Week    metFORMIN (GLUCOPHAGE) 500 MG tablet Take 1 tablet by mouth Daily With Breakfast.   12/6/2023    metoprolol tartrate (LOPRESSOR) 25 MG tablet Take 1.5 tablets by mouth 2 (Two) Times a Day.   12/6/2023    nystatin (MYCOSTATIN) 193130 UNIT/GM powder Apply small amount topically twice a day to abd folds and under Bilat breast for prevention.   12/6/2023    polyethylene glycol (MiraLax) 17 GM/SCOOP powder Take 17 g by mouth Daily As Needed.   Past Week   Metoprolol 37.5 mg twice daily  Amlodipine 10 mg daily  Lipitor  20 mg daily  Gabapentin 300 mg twice  "daily  Levothyroxine 50 mcg daily  Lisinopril 40 mg daily  Metformin 500 mg daily    History  Past Medical History:   Diagnosis Date    Diabetes mellitus     Hypertension     Ischemia of small intestine        History reviewed. No pertinent surgical history.  History reviewed. No pertinent family history.     Medications Prior to Admission   Medication Sig Dispense Refill Last Dose    acetaminophen (TYLENOL) 325 MG tablet Take 2 tablets by mouth Every 6 (Six) Hours As Needed for Mild Pain.   Past Week    amLODIPine (NORVASC) 10 MG tablet Take 1 tablet by mouth Daily.   12/6/2023    atorvastatin (LIPITOR) 20 MG tablet Take 1 tablet by mouth Daily.   12/6/2023    docusate sodium (COLACE) 100 MG capsule Take 1 capsule by mouth 2 (Two) Times a Day As Needed for Constipation.   Past Week    furosemide (LASIX) 40 MG tablet Take 1 tablet by mouth Daily.   12/6/2023    gabapentin (NEURONTIN) 300 MG capsule Take 1 capsule by mouth 2 (Two) Times a Day.   12/6/2023    levothyroxine (SYNTHROID, LEVOTHROID) 50 MCG tablet Take 1 tablet by mouth Daily.   12/6/2023    lisinopril (PRINIVIL,ZESTRIL) 40 MG tablet Take 1 tablet by mouth Daily.   12/6/2023    melatonin 3 MG tablet 1 tablet At Night As Needed for Sleep.   Past Week    metFORMIN (GLUCOPHAGE) 500 MG tablet Take 1 tablet by mouth Daily With Breakfast.   12/6/2023    metoprolol tartrate (LOPRESSOR) 25 MG tablet Take 1.5 tablets by mouth 2 (Two) Times a Day.   12/6/2023    nystatin (MYCOSTATIN) 676659 UNIT/GM powder Apply small amount topically twice a day to abd folds and under Bilat breast for prevention.   12/6/2023    polyethylene glycol (MiraLax) 17 GM/SCOOP powder Take 17 g by mouth Daily As Needed.   Past Week     Allergies:  Penicillins      Objective     Vital Signs  Blood pressure 107/47, pulse 60, temperature 97.9 °F (36.6 °C), temperature source Oral, resp. rate 22, height 165.1 cm (65\"), weight 104 kg (229 lb), SpO2 99%.    Physical Exam:  General Appearance:  " Overweight older woman   Head:  Atraumatic   Eyes:          Conjunctiva pale   Ears:     Throat: Wearing full face BiPAP   Neck: Trachea midline, no palpable thyroid   Back:      Lungs:   Breath sounds are bilateral, no wheezing, diminished at the bases bilaterally, shallow    Heart:  Regular rhythm, S1, S2 auscultated, no murmur   Abdomen:   Healed vertical surgical scar, bowel sounds present, soft   Rectal:     Deferred   Extremities:    Trace pretibial edema   Pulses: Palpable radial pulses   Skin: Warm and dry without rash   Lymph nodes: No cervical adenopathy   Neurologic: Awake, answers questions,  symmetric, follows commands       Results Review:   Lab Results (last 24 hours)       Procedure Component Value Units Date/Time    [place] Draw [[id number]] Collected: 12/07/23 1541    Specimen: Blood Updated: 12/07/23 1947    Narrative:      The following orders were created for panel order [place] Draw.  Procedure                               Abnormality         Status                     ---------                               -----------         ------                     Green Top (Gel)[[id number]]                                  Final result               Lavender Top[[id number]]                                     Final result               Gold Top - SST[[id number]]                                   Final result               P[place]on Top[[id number]]                                         Final result               Light Blue Top[[id number]]                                   Final result                 Please view results for these tests on the individual orders.    Gray Top [[id number]] Collected: 12/07/23 1541    Specimen: Blood Updated: 12/07/23 1947     Extra Tube Hold for add-ons.     Comment: Auto resulted.       High Sensitivity Troponin T 2Hr [[id number]]  (Abnormal) Collected: 12/07/23 1905    Specimen: Blood Updated: 12/07/23 1941     HS Troponin T 80 ng/L      Troponin T Delta -2 ng/L     Narrative:       High Sensitive Troponin T Reference Range:  <14.0 ng/L- Negative Female for AMI  <22.0 ng/L- Negative Male for AMI  >=14 - Abnormal Female indicating possible myocardial injury.  >=22 - Abnormal Male indicating possible myocardial injury.   Clinicians would have to utilize clinical acumen, EKG, Troponin, and serial changes to determine if it is an Acute Myocardial Infarction or myocardial injury due to an underlying chronic condition.         STAT Lactic Acid, Reflex [[id number]]  (Normal) Collected: 12/07/23 1905    Specimen: Blood Updated: 12/07/23 1930     Lactate 1.1 mmol/L      Comment: Falsely depressed results may occur on samples drawn from patients receiving N-Acetylcysteine (NAC) or Metamizole.       Single High Sensitivity Troponin T [[id number]]  (Abnormal) Collected: 12/07/23 1707    Specimen: Blood Updated: 12/07/23 1818     HS Troponin T 82 ng/L     Narrative:      High Sensitive Troponin T Reference Range:  <14.0 ng/L- Negative Female for AMI  <22.0 ng/L- Negative Male for AMI  >=14 - Abnormal Female indicating possible myocardial injury.  >=22 - Abnormal Male indicating possible myocardial injury.   Clinicians would have to utilize clinical acumen, EKG, Troponin, and serial changes to determine if it is an Acute Myocardial Infarction or myocardial injury due to an underlying chronic condition.         Procalcitonin [[id number]]  (Normal) Collected: 12/07/23 1541    Specimen: Blood Updated: 12/07/23 1802     Procalcitonin 0.23 ng/mL     Narrative:      As a Marker for Sepsis (Non-Neonates):    1. <0.5 ng/mL represents a low risk of severe sepsis and/or septic shock.  2. >2 ng/mL represents a high risk of severe sepsis and/or septic shock.    As a Marker for Lower Respiratory Tract Infections that require antibiotic therapy:    PCT on Admission    Antibiotic Therapy       6-12 Hrs later    >0.5                Strongly Recommended  >0.25 - <0.5        Recommended   0.1 - 0.25          Discouraged       "        Remeasure/reassess PCT  <0.1                Strongly Discouraged     Remeasure/reassess PCT    As 28 day mortality risk marker: \"Change in Procalcitonin Result\" (>80% or <=80%) if Day 0 (or Day 1) and Day 4 values are available. Refer to http://[email or web address]-pct-calculator.com    Change in PCT <=80%  A decrease of PCT levels below or equal to 80% defines a positive change in PCT test result representing a higher risk for 28-day all-cause mortality of patients diagnosed with severe sepsis for septic shock.    Change in PCT >80%  A decrease of PCT levels of more than 80% defines a negative change in PCT result representing a lower risk for 28-day all-cause mortality of patients diagnosed with severe sepsis or septic shock.       Blood Gas, Arterial With Co-Ox [[id number]]  (Abnormal) Collected: 12/07/23 1756    Specimen: Arterial Blood Updated: 12/07/23 1757     Site Left Radial     Jaxon's Test N/A     pH, Arterial 7.358 pH units      pCO2, Arterial 66.8 mm Hg      Comment: 83 Value above reference range        pO2, Arterial 119.0 mm Hg      Comment: 83 Value above reference range        HCO3, Arterial 37.5 mmol/L      Base Excess, Arterial 10.4 mmol/L      Hemoglobin, Blood Gas 8.7 g/dL      Comment: 84 Value below reference range        Hematocrit, Blood Gas 26.7 %      Oxyhemoglobin 97.6 %      Methemoglobin 0.30 %      Carboxyhemoglobin 1.4 %      CO2 Content 39.6 mmol/L      Temperature 37.0     Barometric Pressure for Blood Gas --     Comment: N/A        Modality BiPap     FIO2 40 %      Rate 0 Breaths/minute      PIP 0 cmH2O      Comment: Meter: [id number]     :  [id number]        IPAP 0     EPAP 0     pH, Temp Corrected 7.358 pH Units      pCO2, Temperature Corrected 66.8 mm Hg      pO2, Temperature Corrected 119 mm Hg     C-reactive Protein [[id number]]  (Abnormal) Collected: 12/07/23 1541    Specimen: Blood Updated: 12/07/23 1755     C-Reactive Protein 4.79 mg/dL     Magnesium [[id number]]  " (Normal) Collected: 12/07/23 1541    Specimen: Blood Updated: 12/07/23 1755     Magnesium 1.7 mg/dL     Lavender Top [[id number]] Collected: 12/07/23 1636    Specimen: Blood Updated: 12/07/23 1746     Extra Tube hold for add-on     Comment: Auto resulted       COVID PRE-OP / PRE-PROCEDURE SCREENING ORDER (NO ISOLATION) - Swab, Nasopharynx [[id number]]  (Abnormal) Collected: 12/07/23 1550    Specimen: Swab from Nasopharynx Updated: 12/07/23 1709    Narrative:      The following orders were created for panel order COVID PRE-OP / PRE-PROCEDURE SCREENING ORDER (NO ISOLATION) - Swab, Nasopharynx.  Procedure                               Abnormality         Status                     ---------                               -----------         ------                     Respiratory Panel PCR w/...[[id number]]  Abnormal            Final result                 Please view results for these tests on the individual orders.    Respiratory Panel PCR w/COVID-19(SARS-CoV-2) WILFREDO/GISELA/CHERRY/PAD/COR/TIFFANY In-House, NP Swab in UTM/VTM, 2 HR TAT - Swab, Nasopharynx [[id number]]  (Abnormal) Collected: 12/07/23 1550    Specimen: Swab from Nasopharynx Updated: 12/07/23 1709     ADENOVIRUS, PCR Not Detected     Coronavirus 229E Not Detected     Coronavirus HKU1 Not Detected     Coronavirus NL63 Not Detected     Coronavirus OC43 Not Detected     COVID19 Detected     Human Metapneumovirus Not Detected     Human Rhinovirus/Enterovirus Not Detected     Influenza A PCR Not Detected     Influenza B PCR Not Detected     Parainfluenza Virus 1 Not Detected     Parainfluenza Virus 2 Not Detected     Parainfluenza Virus 3 Not Detected     Parainfluenza Virus 4 Not Detected     RSV, PCR Not Detected     Bordetella pertussis pcr Not Detected     Bordetella parapertussis PCR Not Detected     Chlamydophila pneumoniae PCR Not Detected     Mycoplasma pneumo by PCR Not Detected    Narrative:      In the setting of a positive respiratory panel with a viral infection  PLUS a negative procalcitonin without other underlying concern for bacterial infection, consider observing off antibiotics or discontinuation of antibiotics and continue supportive care. If the respiratory panel is positive for atypical bacterial infection (Bordetella pertussis, Chlamydophila pneumoniae, or Mycoplasma pneumoniae), consider antibiotic de-escalation to target atypical bacterial infection.    CBC & Differential [[id number]]  (Abnormal) Collected: 12/07/23 1636    Specimen: Blood Updated: 12/07/23 1704    Narrative:      The following orders were created for panel order CBC & Differential.  Procedure                               Abnormality         Status                     ---------                               -----------         ------                     CBC Auto Differential[[id number]]        Abnormal            Final result               Scan Slide[[id number]]                                                                    Please view results for these tests on the individual orders.    CBC Auto Differential [[id number]]  (Abnormal) Collected: 12/07/23 1636    Specimen: Blood Updated: 12/07/23 1704     WBC 6.34 10*3/mm3      RBC 2.95 10*6/mm3      Hemoglobin 8.5 g/dL      Hematocrit 29.5 %      .0 fL      MCH 28.8 pg      MCHC 28.8 g/dL      RDW 14.8 %      RDW-SD 54.3 fl      MPV 10.9 fL      Platelets 211 10*3/mm3      Neutrophil % 64.6 %      Lymphocyte % 16.6 %      Monocyte % 17.8 %      Eosinophil % 0.0 %      Basophil % 0.2 %      Immature Grans % 0.8 %      Neutrophils, Absolute 4.10 10*3/mm3      Lymphocytes, Absolute 1.05 10*3/mm3      Monocytes, Absolute 1.13 10*3/mm3      Eosinophils, Absolute 0.00 10*3/mm3      Basophils, Absolute 0.01 10*3/mm3      Immature Grans, Absolute 0.05 10*3/mm3      nRBC 0.0 /100 WBC     Blood Culture - Blood, Arm, Right [[id number]] Collected: 12/07/23 1540    Specimen: Blood from Arm, Right Updated: 12/07/23 1658    Blood Culture - Blood, Arm,  Right [[id number]] Collected: 12/07/23 1550    Specimen: Blood from Arm, Right Updated: 12/07/23 1657    Green Top (Gel) [[id number]] Collected: 12/07/23 1541    Specimen: Blood Updated: 12/07/23 1646     Extra Tube Hold for add-ons.     Comment: Auto resulted.       Gold Top - SST [[id number]] Collected: 12/07/23 1541    Specimen: Blood Updated: 12/07/23 1646     Extra Tube Hold for add-ons.     Comment: Auto resulted.       Light Blue Top [[id number]] Collected: 12/07/23 1541    Specimen: Blood Updated: 12/07/23 1646     Extra Tube Hold for add-ons.     Comment: Auto resulted       Lactic Acid, Plasma [[id number]]  (Abnormal) Collected: 12/07/23 1541    Specimen: Blood Updated: 12/07/23 1642     Lactate 2.3 mmol/L      Comment: Falsely depressed results may occur on samples drawn from patients receiving N-Acetylcysteine (NAC) or Metamizole.       Comprehensive Metabolic Panel [[id number]]  (Abnormal) Collected: 12/07/23 1541    Specimen: Blood Updated: 12/07/23 1622     Glucose 128 mg/dL      BUN 15 mg/dL      Creatinine 1.22 mg/dL      Sodium 143 mmol/L      Potassium 4.1 mmol/L      Comment: Slight hemolysis detected by analyzer. Result may be falsely elevated.        Chloride 99 mmol/L      CO2 34.0 mmol/L      Calcium 9.0 mg/dL      Total Protein 5.5 g/dL      Albumin 3.7 g/dL      ALT (SGPT) 7 U/L      AST (SGOT) 18 U/L      Alkaline Phosphatase 84 U/L      Total Bilirubin 0.3 mg/dL      Globulin 1.8 gm/dL      Comment: Calculated Result        A/G Ratio 2.1 g/dL      BUN/Creatinine Ratio 12.3     Anion Gap 10.0 mmol/L      eGFR 45.5 mL/min/1.73     Narrative:      GFR Normal >60  Chronic Kidney Disease <60  Kidney Failure <15    The GFR formula is only valid for adults with stable renal function between ages 18 and 70.    Blood Gas, Arterial With Co-Ox [[id number]]  (Abnormal) Collected: 12/07/23 1620    Specimen: Arterial Blood Updated: 12/07/23 1620     Site Right Brachial     Jaxon's Test N/A     pH, Arterial  7.322 pH units      Comment: 84 Value below reference range        pCO2, Arterial 73.0 mm Hg      Comment: 86 Value above critical limit        pO2, Arterial 42.4 mm Hg      Comment: 85 Value below critical limit        HCO3, Arterial 37.8 mmol/L      Base Excess, Arterial 10.0 mmol/L      Hemoglobin, Blood Gas 8.6 g/dL      Comment: 84 Value below reference range        Hematocrit, Blood Gas 26.3 %      Oxyhemoglobin 68.9 %      Comment: 84 Value below reference range        Methemoglobin 0.40 %      Carboxyhemoglobin 1.5 %      CO2 Content 40.0 mmol/L      Temperature 37.0     Barometric Pressure for Blood Gas --     Comment: N/A        Modality Nasal Cannula     FIO2 28 %      Rate 0 Breaths/minute      PIP 0 cmH2O      Comment: Meter: [id number]     :  [id number]        IPAP 0     EPAP 0     Notified [facility] nmirian md     Notified By [id number]     Notified Time 12/07/2023 16:26     pH, Temp Corrected 7.322 pH Units      pCO2, Temperature Corrected 73.0 mm Hg      pO2, Temperature Corrected 42.4 mm Hg     BNP [[id number]]  (Abnormal) Collected: 12/07/23 1541    Specimen: Blood Updated: 12/07/23 1619     proBNP 3,719.0 pg/mL     Narrative:      This assay is used as an aid in the diagnosis of individuals suspected of having heart failure. It can be used as an aid in the diagnosis of acute decompensated heart failure (ADHF) in patients presenting with signs and symptoms of ADHF to the emergency department (ED). In addition, NT-proBNP of <300 pg/mL indicates ADHF is not likely.    Age Range Result Interpretation  NT-proBNP Concentration (pg/mL:      <50             Positive            >450                   Gray                 300-450                    Negative             <300    50-75           Positive            >900                  Gray                300-900                  Negative            <300      >75             Positive            >1800                  Gray                300-1800                   Negative            <300          Imaging Results (Last 24 Hours)       Procedure Component Value Units Date/Time    XR Chest 1 View [[id number]] Collected: 12/07/23 1534     Updated: 12/07/23 1538    Narrative:      XR CHEST 1 VW    Date of Exam: 12/7/2023 3:16 PM EST    Indication: SOA triage protocol    Comparison: None available.    Findings:  Elevated right hemidiaphragm. Mildly prominent cardiomediastinal silhouette. There is some perihilar vascular congestion and diffuse interstitial prominence which may reflect some interstitial edema. No focal consolidation. No definite pleural effusion.   No pneumothorax. No acute osseous findings.      Impression:      Impression:  Elevated right hemidiaphragm with perihilar vascular congestion and diffuse interstitial prominence suggestive of some interstitial edema.      Electronically Signed: Sterling Shaffer MD    12/7/2023 3:35 PM EST    Workstation ID: [id number]         EKG reveals a right bundle branch block, sinus rhythm rate of 62 bpm QTc 450 ms    PROBLEM LIST    COVID-19 pneumonia  Acute mixed respiratory failure  Acute kidney injury  Anemia  Diabetes mellitus type 2 with neuropathy  Hypertension  Elevated troponin    Assessment & Plan     Complex 78-year-old woman, non-smoker with recent small bowel ischemia and surgery complicated by acute kidney injury requiring dialysis, right lower lobe pneumonia, and acute respiratory failure.  She was ultimately discharged to the [facility] for rehab.  Patient reports that she had been ambulatory and had come off supplemental oxygen.  She has no history of underlying lung disease.  She has a 2-day history of weakness and shortness of air.  COVID-19 is apparently going around the staff and patients at the [facility] and she swab positive.  She had been eating and drinking adequately on her own but appetite has gotten poor.  Her initial blood gas revealed a pH of 7.32, pCO2 of 43 and pO2 of 42 on 2 L.  She was placed on  BiPAP 18/6, rate of 18 and 40%.  Repeat ABG pH 7.35, pCO2 66.8, pO2 119.  On BiPAP she is in no respiratory distress.  She did swab positive for COVID-19.  She received Decadron 6 mg IV, furosemide 80 mg IV, remdesivir 200 mg IV in the emergency room.  She does not have a history of heart disease.  Her echocardiogram at [facility] last month revealed an EF of 70%, some left ventricular hypertrophy and evidence of pulmonary hypertension, no valvular disease.  Urine output is not recorded post furosemide.  She does have an elevated troponin T of 80.  EKG reveals a right bundle branch block and sinus bradycardia.  She has a known history of diabetes mellitus.  Initial blood glucose was 128.  She is on metformin as an outpatient.  She has a history of diabetic neuropathy for which she takes gabapentin 300 mg twice daily.  She also has a history of hypertension.  She was taking metformin, amlodipine, lisinopril as an outpatient.  Systolic blood pressure is 107 currently, heart rate is 60 so we will plan on holding antihypertensive medications.  She has mild increase in her creatinine, 1.22.  She did have acute kidney injury requiring dialysis after her bowel surgery in October.  Her last hemodialysis session was November 7 and her dialysis access was removed November 13.  Discharge summary does not comment on a baseline creatinine.  She has a persistent anemia.  Hemoglobin today is 8.6.  Hemoglobin at discharge from [facility] was 7.8.  She denies melena or hematemesis.    -Remdesivir, dexamethasone for her COVID-19 pneumonia  -BiPAP support and if she deteriorates intubation  -Hold antihypertensive medications  -Sliding scale insulin  -Monitor H&H  -Iron studies  -Continue gabapentin  -Continue levothyroxine  -Trend troponin  -Monitor urine output, BUN and creatinine  -Avoid hypotension  -No family contact is present in the computer so I will attempt to update family if nursing can locate next of kin      I  discussed the patients findings and my recommendations with ED staff    Patient is critically ill requiring BiPAP to support her ventilation and oxygenation.  She is at risk of deteriorating and potentially needing intubation mechanical ventilation and therefore needs ICU monitoring and care.  I personally interviewed the patient, reviewed her medical record in detail, performed the physical examination, review of systems, dictated assessment and plan.  LISA collected historical data from the chart.  Time reflects only the time that I spent taking care of the patient    Barb Villar MD  12/07/23  20:27 EST    Time: Critical care 55 min    Please note that portions of this note were completed with voice recognition program.    Electronically signed by LISA Martinez, 12/07/23, 7:16 PM EST.

## 2023-12-09 ENCOUNTER — APPOINTMENT (OUTPATIENT)
Dept: CT IMAGING | Facility: HOSPITAL | Age: 78
DRG: 871 | End: 2023-12-09
Payer: MEDICARE

## 2023-12-09 LAB
ALBUMIN SERPL-MCNC: 3.3 G/DL (ref 3.5–5.2)
ALBUMIN/GLOB SERPL: 1.5 G/DL
ALP SERPL-CCNC: 68 U/L (ref 39–117)
ALT SERPL W P-5'-P-CCNC: 8 U/L (ref 1–33)
ANION GAP SERPL CALCULATED.3IONS-SCNC: 7 MMOL/L (ref 5–15)
ARTERIAL PATENCY WRIST A: ABNORMAL
AST SERPL-CCNC: 15 U/L (ref 1–32)
ATMOSPHERIC PRESS: ABNORMAL MM[HG]
BASE EXCESS BLDA CALC-SCNC: 12.6 MMOL/L (ref 0–2)
BDY SITE: ABNORMAL
BILIRUB SERPL-MCNC: 0.2 MG/DL (ref 0–1.2)
BODY TEMPERATURE: 37
BUN SERPL-MCNC: 23 MG/DL (ref 8–23)
BUN/CREAT SERPL: 27.1 (ref 7–25)
CALCIUM SPEC-SCNC: 8.7 MG/DL (ref 8.6–10.5)
CHLORIDE SERPL-SCNC: 102 MMOL/L (ref 98–107)
CO2 BLDA-SCNC: 37.4 MMOL/L (ref 22–33)
CO2 SERPL-SCNC: 36 MMOL/L (ref 22–29)
COHGB MFR BLD: 1.2 % (ref 0–2)
CREAT SERPL-MCNC: 0.85 MG/DL (ref 0.57–1)
DEPRECATED RDW RBC AUTO: 47.8 FL (ref 37–54)
EGFRCR SERPLBLD CKD-EPI 2021: 70.2 ML/MIN/1.73
EPAP: 0
ERYTHROCYTE [DISTWIDTH] IN BLOOD BY AUTOMATED COUNT: 13.8 % (ref 12.3–15.4)
GLOBULIN UR ELPH-MCNC: 2.2 GM/DL
GLUCOSE BLDC GLUCOMTR-MCNC: 111 MG/DL (ref 70–130)
GLUCOSE BLDC GLUCOMTR-MCNC: 136 MG/DL (ref 70–130)
GLUCOSE BLDC GLUCOMTR-MCNC: 147 MG/DL (ref 70–130)
GLUCOSE BLDC GLUCOMTR-MCNC: 153 MG/DL (ref 70–130)
GLUCOSE SERPL-MCNC: 135 MG/DL (ref 65–99)
HCO3 BLDA-SCNC: 36.1 MMOL/L (ref 20–26)
HCT VFR BLD AUTO: 28.2 % (ref 34–46.6)
HCT VFR BLD CALC: 27.2 % (ref 38–51)
HGB BLD-MCNC: 8.5 G/DL (ref 12–15.9)
HGB BLDA-MCNC: 8.9 G/DL (ref 14–18)
INHALED O2 CONCENTRATION: 28 %
IPAP: 0
MAGNESIUM SERPL-MCNC: 1.8 MG/DL (ref 1.6–2.4)
MCH RBC QN AUTO: 28.4 PG (ref 26.6–33)
MCHC RBC AUTO-ENTMCNC: 30.1 G/DL (ref 31.5–35.7)
MCV RBC AUTO: 94.3 FL (ref 79–97)
METHGB BLD QL: ABNORMAL
MODALITY: ABNORMAL
OXYHGB MFR BLDV: 98.9 % (ref 94–99)
PAW @ PEAK INSP FLOW SETTING VENT: 0 CMH2O
PCO2 BLDA: 41.3 MM HG (ref 35–45)
PCO2 TEMP ADJ BLD: 41.3 MM HG (ref 35–45)
PH BLDA: 7.55 PH UNITS (ref 7.35–7.45)
PH, TEMP CORRECTED: 7.55 PH UNITS
PHOSPHATE SERPL-MCNC: 3.6 MG/DL (ref 2.5–4.5)
PLATELET # BLD AUTO: 222 10*3/MM3 (ref 140–450)
PMV BLD AUTO: 11 FL (ref 6–12)
PO2 BLDA: 92.6 MM HG (ref 83–108)
PO2 TEMP ADJ BLD: 92.6 MM HG (ref 83–108)
POTASSIUM SERPL-SCNC: 3.8 MMOL/L (ref 3.5–5.2)
PROT SERPL-MCNC: 5.5 G/DL (ref 6–8.5)
RBC # BLD AUTO: 2.99 10*6/MM3 (ref 3.77–5.28)
SODIUM SERPL-SCNC: 145 MMOL/L (ref 136–145)
TOTAL RATE: 0 BREATHS/MINUTE
WBC NRBC COR # BLD AUTO: 6.36 10*3/MM3 (ref 3.4–10.8)

## 2023-12-09 PROCEDURE — 84100 ASSAY OF PHOSPHORUS: CPT | Performed by: INTERNAL MEDICINE

## 2023-12-09 PROCEDURE — 36600 WITHDRAWAL OF ARTERIAL BLOOD: CPT

## 2023-12-09 PROCEDURE — 63710000001 INSULIN REGULAR HUMAN PER 5 UNITS: Performed by: NURSE PRACTITIONER

## 2023-12-09 PROCEDURE — 25510000001 IOPAMIDOL PER 1 ML: Performed by: INTERNAL MEDICINE

## 2023-12-09 PROCEDURE — 25010000002 REMDESIVIR 100 MG/20ML SOLUTION 1 EACH VIAL

## 2023-12-09 PROCEDURE — 25810000003 SODIUM CHLORIDE 0.9 % SOLUTION 250 ML FLEX CONT

## 2023-12-09 PROCEDURE — 99291 CRITICAL CARE FIRST HOUR: CPT | Performed by: INTERNAL MEDICINE

## 2023-12-09 PROCEDURE — 83735 ASSAY OF MAGNESIUM: CPT | Performed by: INTERNAL MEDICINE

## 2023-12-09 PROCEDURE — 71275 CT ANGIOGRAPHY CHEST: CPT

## 2023-12-09 PROCEDURE — 80053 COMPREHEN METABOLIC PANEL: CPT | Performed by: INTERNAL MEDICINE

## 2023-12-09 PROCEDURE — 82805 BLOOD GASES W/O2 SATURATION: CPT

## 2023-12-09 PROCEDURE — 82948 REAGENT STRIP/BLOOD GLUCOSE: CPT

## 2023-12-09 PROCEDURE — 83050 HGB METHEMOGLOBIN QUAN: CPT

## 2023-12-09 PROCEDURE — 85027 COMPLETE CBC AUTOMATED: CPT | Performed by: INTERNAL MEDICINE

## 2023-12-09 PROCEDURE — 82375 ASSAY CARBOXYHB QUANT: CPT

## 2023-12-09 PROCEDURE — 63710000001 DEXAMETHASONE PER 0.25 MG: Performed by: INTERNAL MEDICINE

## 2023-12-09 PROCEDURE — 25010000002 ENOXAPARIN PER 10 MG: Performed by: INTERNAL MEDICINE

## 2023-12-09 RX ADMIN — SENNOSIDES AND DOCUSATE SODIUM 2 TABLET: 8.6; 5 TABLET ORAL at 08:32

## 2023-12-09 RX ADMIN — REMDESIVIR 100 MG: 100 INJECTION, POWDER, LYOPHILIZED, FOR SOLUTION INTRAVENOUS at 19:37

## 2023-12-09 RX ADMIN — GABAPENTIN 300 MG: 300 CAPSULE ORAL at 08:32

## 2023-12-09 RX ADMIN — Medication 10 ML: at 08:32

## 2023-12-09 RX ADMIN — DEXAMETHASONE 6 MG: 4 TABLET ORAL at 08:32

## 2023-12-09 RX ADMIN — Medication 10 ML: at 21:23

## 2023-12-09 RX ADMIN — FUROSEMIDE 40 MG: 40 TABLET ORAL at 08:32

## 2023-12-09 RX ADMIN — IOPAMIDOL 85 ML: 755 INJECTION, SOLUTION INTRAVENOUS at 18:53

## 2023-12-09 RX ADMIN — LEVOTHYROXINE SODIUM 50 MCG: 0.05 TABLET ORAL at 05:39

## 2023-12-09 RX ADMIN — GABAPENTIN 300 MG: 300 CAPSULE ORAL at 21:23

## 2023-12-09 RX ADMIN — ENOXAPARIN SODIUM 40 MG: 100 INJECTION SUBCUTANEOUS at 08:32

## 2023-12-09 RX ADMIN — SENNOSIDES AND DOCUSATE SODIUM 2 TABLET: 8.6; 5 TABLET ORAL at 21:23

## 2023-12-09 RX ADMIN — ENOXAPARIN SODIUM 40 MG: 100 INJECTION SUBCUTANEOUS at 21:23

## 2023-12-09 RX ADMIN — INSULIN HUMAN 2 UNITS: 100 INJECTION, SOLUTION PARENTERAL at 16:51

## 2023-12-09 NOTE — PROGRESS NOTES
Pulmonary/Critical Care Follow-up     LOS: 2 days   Patient Care Team:  Cami Gonzalez MD as PCP - General (Internal Medicine)    Chief Complaint: COVID-pneumonia      Subjective     History reviewed and updated in EMR as indicated    Interval History:     Patient has remained off of BiPAP overnight and on 3 L nasal cannula.  She reports some tremors that are improved from admission but is still present.  She reports that is difficult to hold her cell phone, for instance.  No fevers or chills.    History taken from: Patient, staff    PMH/FH/Social History were reviewed and updated appropriately in the electronic medical record.     Review of Systems:    Review of 14 systems was completed with positives and pertinent negatives noted in the subjective section.  All other systems reviewed and are negative.         Objective     Vital Signs  Temp:  [97.9 °F (36.6 °C)-99.1 °F (37.3 °C)] 99.1 °F (37.3 °C)  Heart Rate:  [58-79] 71  Resp:  [20] 20  BP: (110-143)/(48-73) 140/59  12/08 0701 - 12/09 0700  In: 298 [P.O.:250; I.V.:48]  Out: 950 [Urine:950]  Body mass index is 38.23 kg/m².     IV drips:  Pharmacy Consult - Remdesivir       Physical Exam:     Constitutional:   Alert, in no acute distress   Head:   Normocephalic, atraumatic   Eyes:           Lids and lashes normal, conjunctivae and sclerae normal.  PER   ENMT:  Ears appear intact with no abnormalities noted     Lips normal.     Neck:  Trachea midline, no JVD   Lungs/Resp:    Normal effort, symmetric chest rise, no crepitus, clear to      auscultation bilaterally.               Heart/CV:   Regular rhythm and normal rate, no murmur   Abdomen/GI:    Soft, nontender, nondistended   :    Deferred   Extremities/MSK:  No clubbing or cyanosis.  No edema.     Pulses:  Pulses palpable and equal bilaterally   Skin:  No bleeding, bruising or rash   Heme/Lymph:  No cervical or supraclavicular adenopathy.   Neurologic:    Psychiatric:    Moves all extremities with no  obvious focal motor deficit.  Cranial nerves 2 - 12 grossly intact  Non-agitated, normal affect.    The above physical exam findings were reviewed and reflect my exam findings as of today's exam.   Electronically signed by:  Jose Manuel Osullivan MD  12/09/23  16:36 EST      Results Review:     I reviewed the patient's new clinical results.   Results from last 7 days   Lab Units 12/09/23  0307 12/08/23  0355 12/07/23  1541   SODIUM mmol/L 145 144 143   POTASSIUM mmol/L 3.8 4.2 4.1   CHLORIDE mmol/L 102 101 99   CO2 mmol/L 36.0* 33.0* 34.0*   BUN mg/dL 23 18 15   CREATININE mg/dL 0.85 1.05* 1.22*   CALCIUM mg/dL 8.7 9.0 9.0   BILIRUBIN mg/dL 0.2 0.3 0.3   ALK PHOS U/L 68 90 84   ALT (SGPT) U/L 8 8 7   AST (SGOT) U/L 15 15 18   GLUCOSE mg/dL 135* 152* 128*     Results from last 7 days   Lab Units 12/09/23  0307 12/07/23  1636   WBC 10*3/mm3 6.36 6.34   HEMOGLOBIN g/dL 8.5* 8.5*   HEMATOCRIT % 28.2* 29.5*   PLATELETS 10*3/mm3 222 211     Results from last 7 days   Lab Units 12/08/23  0719   PH, ARTERIAL pH units 7.325*   PO2 ART mm Hg 134.0*   PCO2, ARTERIAL mm Hg 71.9*   HCO3 ART mmol/L 37.5*     Results from last 7 days   Lab Units 12/09/23  0307 12/07/23  1541   MAGNESIUM mg/dL 1.8 1.7   PHOSPHORUS mg/dL 3.6  --        I reviewed the patient's new imaging including images and reports.    Chest x-ray 12/7/2023 shows elevated right hemidiaphragm with bilateral patchy airspace disease.    Medication Review:   dexAMETHasone, 6 mg, Oral, Daily   Or  dexAMETHasone, 6 mg, Intravenous, Daily  enoxaparin, 40 mg, Subcutaneous, Q12H  furosemide, 40 mg, Oral, Daily  gabapentin, 300 mg, Oral, Q12H  insulin regular, 2-7 Units, Subcutaneous, 4x Daily AC & at Bedtime  levothyroxine, 50 mcg, Oral, Q AM  remdesivir, 100 mg, Intravenous, Q24H  senna-docusate sodium, 2 tablet, Oral, BID  sodium chloride, 10 mL, Intravenous, Q12H      Pharmacy Consult - Remdesivir,         Assessment & Plan         Acute hypercapnic respiratory failure     COVID-19 virus detected    Elevated brain natriuretic peptide (BNP) level    FRAN (acute kidney injury)    Sepsis    Type 2 diabetes mellitus    Anemia    Acute hypoxemic respiratory failure due to COVID-19    78 y.o. with history of HTN, T2DM, recent small intestinal ischemia presented to [facility] ED on 12/7/2023 with weakness, fever, and hypoxemia.  The patient lives at the [facility] and was found to have increased confusion and weakness today.  Temperature was 100.6 later measured at greater than 102 Fahrenheit.  Also was found to be hypoxemic and she was transported to the hospital for further evaluation.    Patient had combined hypoxemic and hypercapnic respiratory failure.  She has required oxygen and has generally been maintained with a normal oxygen saturation on supplemental oxygen but does have a documented oxygen saturation of 87% on supplemental oxygen meeting the criteria for hypoxemic respiratory failure.  ABG initially had a pCO2 of 73 with a pH of 7.32 consistent with hypercapnic respiratory failure.    Patient was admitted to the ICU on BiPAP for respiratory failure.  She has been treated with remdesivir and dexamethasone for COVID-pneumonia.    Chest x-ray shows elevated right hemidiaphragm.  It looks like patient had some imaging at an outside hospital that showed a pleural effusion.  I am concerned about the possibility of pulmonary embolism in the setting of COVID pneumonia.  I am going to obtain a CTA to further evaluate abnormal chest x-ray and rule out pulmonary embolism.    Patient appears to be overall somewhat improved, though her respiratory status remains tenuous.  She has some ongoing tremors and I am going to recheck an ABG just to see if she is still retaining carbon dioxide.  May require restarting BiPAP.    Plan:  1.  For COVID-pneumonia: Dexamethasone.  Remdesivir.  2.  For acute hypoxemic and hypercapnic respiratory failure secondary to COVID-pneumonia: BiPAP as needed.   Currently on nasal cannula oxygen.  Repeat ABG at this point if symptomatic.  Check CTA chest.  3.  For hypertension: Currently normotensive.  Hold antihypertensives for now.  4.  For anemia: Improved from prior admission at [facility].  Monitor.  5.  For hypothyroidism: Levothyroxine.  6.  For FRAN: Improving.  Monitor.  Avoid nephrotoxins if possible and renally dose medications if appropriate.  7.  For diabetes: Correction insulin for now.  8.  For nutrition: Advance diet.  9.  For neuropathy: Continue gabapentin.  10.  For abnormal chest x-ray: Elevated right hemidiaphragm with recent history of pleural effusion per report.  Check CTA chest both to rule out pulmonary embolism and further evaluate abnormal chest x-ray.    Patient is critically ill secondary to respiratory failure due to COVID-pneumonia with tenuous respiratory status in the setting of acute mixed respiratory failure and has high risk of life-threatening decompensation in condition.   As such, the patient requires continuous monitoring and frequent reassessment for consideration of adjustment in management to minimize this risk.  I personally reassessed the patient multiple times today.    Critical care time : 35 minutes spent by me personally and independently.(This excludes time spent performing separately reportable procedures and services).  Critical care time includes high complexity decision making to assess, manipulate, and support vital organ system failure in this individual who has impairment of one or more vital organ systems such that there is a high probability of imminent or life threatening deterioration in the patient’s condition.    Electronically signed by:    Jose Manuel Osullivan MD  12/09/23  16:36 EST      *. Please note that portions of this note were completed with [employer] - a voice recognition program.

## 2023-12-09 NOTE — PLAN OF CARE
Goal Outcome Evaluation:                      -Patient on 3 L NC  -Bathed w/ CHG and total linen change performed  -BM x 1  - ml  + 1 unmeasured occurrence   -VSS

## 2023-12-09 NOTE — PLAN OF CARE
Goal Outcome Evaluation:           Progress: improving  -VSS on 1L NC; remains A&Ox4  -pt up to chair for most of shift; 1-2 assist; PT/OT orders placed  - + 1 med occurrence; BM x2 on BSC  -awaiting ABG and CT angio of chest  -pt updating family

## 2023-12-10 LAB
ALBUMIN SERPL-MCNC: 3.1 G/DL (ref 3.5–5.2)
ALBUMIN/GLOB SERPL: 1.5 G/DL
ALP SERPL-CCNC: 61 U/L (ref 39–117)
ALT SERPL W P-5'-P-CCNC: 8 U/L (ref 1–33)
ANION GAP SERPL CALCULATED.3IONS-SCNC: 7 MMOL/L (ref 5–15)
AST SERPL-CCNC: 13 U/L (ref 1–32)
BILIRUB SERPL-MCNC: 0.2 MG/DL (ref 0–1.2)
BUN SERPL-MCNC: 22 MG/DL (ref 8–23)
BUN/CREAT SERPL: 27.2 (ref 7–25)
CALCIUM SPEC-SCNC: 9 MG/DL (ref 8.6–10.5)
CHLORIDE SERPL-SCNC: 101 MMOL/L (ref 98–107)
CO2 SERPL-SCNC: 35 MMOL/L (ref 22–29)
CREAT SERPL-MCNC: 0.81 MG/DL (ref 0.57–1)
DEPRECATED RDW RBC AUTO: 47.6 FL (ref 37–54)
EGFRCR SERPLBLD CKD-EPI 2021: 74.4 ML/MIN/1.73
ERYTHROCYTE [DISTWIDTH] IN BLOOD BY AUTOMATED COUNT: 13.9 % (ref 12.3–15.4)
GLOBULIN UR ELPH-MCNC: 2.1 GM/DL
GLUCOSE BLDC GLUCOMTR-MCNC: 112 MG/DL (ref 70–130)
GLUCOSE BLDC GLUCOMTR-MCNC: 138 MG/DL (ref 70–130)
GLUCOSE BLDC GLUCOMTR-MCNC: 167 MG/DL (ref 70–130)
GLUCOSE BLDC GLUCOMTR-MCNC: 99 MG/DL (ref 70–130)
GLUCOSE SERPL-MCNC: 104 MG/DL (ref 65–99)
HCT VFR BLD AUTO: 28 % (ref 34–46.6)
HGB BLD-MCNC: 8.4 G/DL (ref 12–15.9)
MCH RBC QN AUTO: 28.2 PG (ref 26.6–33)
MCHC RBC AUTO-ENTMCNC: 30 G/DL (ref 31.5–35.7)
MCV RBC AUTO: 94 FL (ref 79–97)
PLATELET # BLD AUTO: 241 10*3/MM3 (ref 140–450)
PMV BLD AUTO: 11 FL (ref 6–12)
POTASSIUM SERPL-SCNC: 3.7 MMOL/L (ref 3.5–5.2)
PROT SERPL-MCNC: 5.2 G/DL (ref 6–8.5)
RBC # BLD AUTO: 2.98 10*6/MM3 (ref 3.77–5.28)
SODIUM SERPL-SCNC: 143 MMOL/L (ref 136–145)
WBC NRBC COR # BLD AUTO: 5.44 10*3/MM3 (ref 3.4–10.8)

## 2023-12-10 PROCEDURE — 25810000003 SODIUM CHLORIDE 0.9 % SOLUTION 250 ML FLEX CONT

## 2023-12-10 PROCEDURE — 25010000002 REMDESIVIR 100 MG/20ML SOLUTION 1 EACH VIAL

## 2023-12-10 PROCEDURE — 97166 OT EVAL MOD COMPLEX 45 MIN: CPT

## 2023-12-10 PROCEDURE — 85027 COMPLETE CBC AUTOMATED: CPT | Performed by: INTERNAL MEDICINE

## 2023-12-10 PROCEDURE — 97161 PT EVAL LOW COMPLEX 20 MIN: CPT

## 2023-12-10 PROCEDURE — 94799 UNLISTED PULMONARY SVC/PX: CPT

## 2023-12-10 PROCEDURE — 99232 SBSQ HOSP IP/OBS MODERATE 35: CPT | Performed by: INTERNAL MEDICINE

## 2023-12-10 PROCEDURE — 97116 GAIT TRAINING THERAPY: CPT

## 2023-12-10 PROCEDURE — 25010000002 ENOXAPARIN PER 10 MG: Performed by: INTERNAL MEDICINE

## 2023-12-10 PROCEDURE — 82948 REAGENT STRIP/BLOOD GLUCOSE: CPT

## 2023-12-10 PROCEDURE — 80053 COMPREHEN METABOLIC PANEL: CPT | Performed by: INTERNAL MEDICINE

## 2023-12-10 PROCEDURE — 63710000001 DEXAMETHASONE PER 0.25 MG: Performed by: INTERNAL MEDICINE

## 2023-12-10 PROCEDURE — 63710000001 INSULIN REGULAR HUMAN PER 5 UNITS: Performed by: NURSE PRACTITIONER

## 2023-12-10 RX ORDER — AMLODIPINE BESYLATE 5 MG/1
5 TABLET ORAL
Status: DISCONTINUED | OUTPATIENT
Start: 2023-12-10 | End: 2023-12-10

## 2023-12-10 RX ORDER — AMLODIPINE BESYLATE 5 MG/1
5 TABLET ORAL ONCE
Status: COMPLETED | OUTPATIENT
Start: 2023-12-10 | End: 2023-12-10

## 2023-12-10 RX ORDER — AMLODIPINE BESYLATE 10 MG/1
10 TABLET ORAL
Status: DISCONTINUED | OUTPATIENT
Start: 2023-12-11 | End: 2023-12-13 | Stop reason: HOSPADM

## 2023-12-10 RX ORDER — ACETAMINOPHEN 325 MG/1
650 TABLET ORAL EVERY 6 HOURS PRN
Status: DISCONTINUED | OUTPATIENT
Start: 2023-12-10 | End: 2023-12-13 | Stop reason: HOSPADM

## 2023-12-10 RX ADMIN — Medication 10 ML: at 08:22

## 2023-12-10 RX ADMIN — INSULIN HUMAN 2 UNITS: 100 INJECTION, SOLUTION PARENTERAL at 18:17

## 2023-12-10 RX ADMIN — SENNOSIDES AND DOCUSATE SODIUM 2 TABLET: 8.6; 5 TABLET ORAL at 08:13

## 2023-12-10 RX ADMIN — ENOXAPARIN SODIUM 40 MG: 100 INJECTION SUBCUTANEOUS at 20:29

## 2023-12-10 RX ADMIN — LEVOTHYROXINE SODIUM 50 MCG: 0.05 TABLET ORAL at 06:09

## 2023-12-10 RX ADMIN — FUROSEMIDE 40 MG: 40 TABLET ORAL at 08:13

## 2023-12-10 RX ADMIN — GABAPENTIN 300 MG: 300 CAPSULE ORAL at 20:29

## 2023-12-10 RX ADMIN — GABAPENTIN 300 MG: 300 CAPSULE ORAL at 08:13

## 2023-12-10 RX ADMIN — ENOXAPARIN SODIUM 40 MG: 100 INJECTION SUBCUTANEOUS at 08:13

## 2023-12-10 RX ADMIN — AMLODIPINE BESYLATE 5 MG: 5 TABLET ORAL at 14:15

## 2023-12-10 RX ADMIN — Medication 10 ML: at 20:29

## 2023-12-10 RX ADMIN — REMDESIVIR 100 MG: 100 INJECTION, POWDER, LYOPHILIZED, FOR SOLUTION INTRAVENOUS at 18:17

## 2023-12-10 RX ADMIN — AMLODIPINE BESYLATE 5 MG: 5 TABLET ORAL at 20:28

## 2023-12-10 RX ADMIN — ACETAMINOPHEN 650 MG: 325 TABLET ORAL at 20:29

## 2023-12-10 RX ADMIN — DEXAMETHASONE 6 MG: 4 TABLET ORAL at 08:13

## 2023-12-10 NOTE — PROGRESS NOTES
Pulmonary/Critical Care Follow-up     LOS: 3 days   Patient Care Team:  Cami Gonzalez MD as PCP - General (Internal Medicine)    Chief Complaint: COVID-pneumonia      Subjective     History reviewed and updated in EMR as indicated    Interval History:     Patient has remained off of BiPAP overnight.  ABG yesterday was okay.  She has been on room air at times but is requiring low-dose nasal cannula oxygen for the most part.  Her main complaint is sore throat.  No fevers or chills.    History taken from: Patient, staff    PMH/FH/Social History were reviewed and updated appropriately in the electronic medical record.     Review of Systems:    Review of 14 systems was completed with positives and pertinent negatives noted in the subjective section.  All other systems reviewed and are negative.         Objective     Vital Signs  Temp:  [97.9 °F (36.6 °C)-99.1 °F (37.3 °C)] 97.9 °F (36.6 °C)  Heart Rate:  [58-85] 80  Resp:  [16-20] 16  BP: ()/(43-87) 145/63  12/09 0701 - 12/10 0700  In: 1225.9 [P.O.:960; I.V.:15.9]  Out: 650 [Urine:650]  Body mass index is 38.23 kg/m².     IV drips:  Pharmacy Consult - Remdesivir       Physical Exam:     Constitutional:   Alert, in no acute distress   Head:   Normocephalic, atraumatic   Eyes:           Lids and lashes normal, conjunctivae and sclerae normal.  PER   ENMT:  Ears appear intact with no abnormalities noted     Lips normal.     Neck:  Trachea midline, no JVD   Lungs/Resp:    Normal effort, symmetric chest rise, no crepitus, clear to      auscultation bilaterally.               Heart/CV:   Regular rhythm and normal rate, no murmur   Abdomen/GI:    Soft, nontender, nondistended   :    Deferred   Extremities/MSK:  No clubbing or cyanosis.  No edema.     Pulses:  Pulses palpable and equal bilaterally   Skin:  No bleeding, bruising or rash   Heme/Lymph:  No cervical or supraclavicular adenopathy.   Neurologic:    Psychiatric:    Moves all extremities with no obvious  focal motor deficit.  Cranial nerves 2 - 12 grossly intact  Non-agitated, normal affect.    The above physical exam findings were reviewed and reflect my exam findings as of today's exam.   Electronically signed by:  Jose Manuel Osullivan MD  12/10/23  09:58 EST      Results Review:     I reviewed the patient's new clinical results.   Results from last 7 days   Lab Units 12/10/23  0323 12/09/23  0307 12/08/23  0355   SODIUM mmol/L 143 145 144   POTASSIUM mmol/L 3.7 3.8 4.2   CHLORIDE mmol/L 101 102 101   CO2 mmol/L 35.0* 36.0* 33.0*   BUN mg/dL 22 23 18   CREATININE mg/dL 0.81 0.85 1.05*   CALCIUM mg/dL 9.0 8.7 9.0   BILIRUBIN mg/dL 0.2 0.2 0.3   ALK PHOS U/L 61 68 90   ALT (SGPT) U/L 8 8 8   AST (SGOT) U/L 13 15 15   GLUCOSE mg/dL 104* 135* 152*     Results from last 7 days   Lab Units 12/10/23  0323 12/09/23  0307 12/07/23  1636   WBC 10*3/mm3 5.44 6.36 6.34   HEMOGLOBIN g/dL 8.4* 8.5* 8.5*   HEMATOCRIT % 28.0* 28.2* 29.5*   PLATELETS 10*3/mm3 241 222 211     Results from last 7 days   Lab Units 12/09/23  1923   PH, ARTERIAL pH units 7.550*   PO2 ART mm Hg 92.6   PCO2, ARTERIAL mm Hg 41.3   HCO3 ART mmol/L 36.1*     Results from last 7 days   Lab Units 12/09/23  0307 12/07/23  1541   MAGNESIUM mg/dL 1.8 1.7   PHOSPHORUS mg/dL 3.6  --        I reviewed the patient's new imaging including images and reports.    CTA chest 12/9/2023 showed no evidence of pulmonary embolism with some mild right base atelectasis.    Chest x-ray 12/7/2023 shows elevated right hemidiaphragm with bilateral patchy airspace disease.    Medication Review:   dexAMETHasone, 6 mg, Oral, Daily   Or  dexAMETHasone, 6 mg, Intravenous, Daily  enoxaparin, 40 mg, Subcutaneous, Q12H  furosemide, 40 mg, Oral, Daily  gabapentin, 300 mg, Oral, Q12H  insulin regular, 2-7 Units, Subcutaneous, 4x Daily AC & at Bedtime  levothyroxine, 50 mcg, Oral, Q AM  remdesivir, 100 mg, Intravenous, Q24H  senna-docusate sodium, 2 tablet, Oral, BID  sodium chloride, 10 mL,  Intravenous, Q12H      Pharmacy Consult - Remdesivir,         Assessment & Plan         Acute hypercapnic respiratory failure    COVID-19 virus detected    Elevated brain natriuretic peptide (BNP) level    FRAN (acute kidney injury)    Sepsis    Type 2 diabetes mellitus    Anemia    Acute hypoxemic respiratory failure due to COVID-19    78 y.o. with history of HTN, T2DM, recent small intestinal ischemia presented to [facility] ED on 12/7/2023 with weakness, fever, and hypoxemia.  The patient lives at the [facility] and was found to have increased confusion and weakness today.  Temperature was 100.6 later measured at greater than 102 Fahrenheit.  Also was found to be hypoxemic and she was transported to the hospital for further evaluation.    Patient had combined hypoxemic and hypercapnic respiratory failure.  She has required oxygen and has generally been maintained with a normal oxygen saturation on supplemental oxygen but does have a documented oxygen saturation of 87% on supplemental oxygen meeting the criteria for hypoxemic respiratory failure.  ABG initially had a pCO2 of 73 with a pH of 7.32 consistent with hypercapnic respiratory failure.    Patient was admitted to the ICU on BiPAP for respiratory failure.  She has been treated with remdesivir and dexamethasone for COVID-pneumonia.    Chest x-ray shows elevated right hemidiaphragm.  CT chest showed some mild right base atelectasis but no evidence of endobronchial lesion.  There is no evidence of pulmonary embolism.  ABG on 12/9/2023 was within normal limits.    Patient patient seems definitively improved today.  I am going to order her to telemetry/hospitalist.  She will probably be ready for transfer to rehabilitation soon.    Plan:  1.  For COVID-pneumonia: Dexamethasone.  Remdesivir.  2.  For acute hypoxemic and hypercapnic respiratory failure secondary to COVID-pneumonia: BiPAP as needed.  Currently on low-dose nasal cannula versus room air alternating.   CTA chest with just some mild right base atelectasis.  Repeat ABG on 12/9/2023 was essentially normal off of BiPAP greater than 24 hours.  3.  For hypertension: Slightly elevated.  Restart amlodipine 5 mg.  Continue to hold lisinopril and metoprolol for now.    4.  For anemia: Improved from prior admission at [facility].  Monitor.  5.  For hypothyroidism: Levothyroxine.  6.  For FRAN: Improving.  Monitor.  Avoid nephrotoxins if possible and renally dose medications if appropriate.  7.  For diabetes: Correction insulin for now.  8.  For nutrition: Advance diet.  9.  For neuropathy: Continue gabapentin.  10.  For abnormal chest x-ray: Mild right base atelectasis on CTA chest.  Mobilize.      Electronically signed by:    Jose Manuel Osullivan MD  12/10/23  09:58 EST      *. Please note that portions of this note were completed with [employer] - a voice recognition program.

## 2023-12-10 NOTE — PLAN OF CARE
Goal Outcome Evaluation:  Plan of Care Reviewed With: patient        Progress: improving  -VSS on room air -1/2L NC to keep O2sats </=90%  -pt remained A&Ox4 all day  -worked with PT/OT; up to chair most of day  -pt updating family  -ordered to tele

## 2023-12-10 NOTE — THERAPY EVALUATION
Patient Name: Mathew Crabtree  : 1945    MRN: [id number]                              Today's Date: 12/10/2023       Admit Date: 2023    Visit Dx:     ICD-10-CM ICD-9-CM   1. COVID-19  U07.1 079.89   2. Hypoxia  R09.02 799.02   3. Fatigue, unspecified type  R53.83 780.79   4. Hypercapnia  R06.89 786.09     Patient Active Problem List   Diagnosis    Acute hypercapnic respiratory failure    COVID-19 virus detected    Elevated brain natriuretic peptide (BNP) level    FRAN (acute kidney injury)    Sepsis    Type 2 diabetes mellitus    Anemia    Acute hypoxemic respiratory failure due to COVID-19     Past Medical History:   Diagnosis Date    Diabetes mellitus     Hypertension     Ischemia of small intestine      History reviewed. No pertinent surgical history.   General Information       Row Name 12/10/23 0951          Physical Therapy Time and Intention    Document Type evaluation  -ES     Mode of Treatment physical therapy  -ES       Row Name 12/10/23 0951          General Information    Patient Profile Reviewed yes  -ES     Prior Level of Function min assist:;all household mobility;community mobility;bed mobility  Pt admitted from the [facility]. States she has been walking with FWW at rehab  -ES     Existing Precautions/Restrictions fall;oxygen therapy device and L/min;other (see comments)  covid, abd incision from recent surgery  -ES     Barriers to Rehab medically complex;previous functional deficit  -ES       Row Name 12/10/23 0951          Living Environment    People in Home friend(s)  -ES       Row Name 12/10/23 0951          Home Main Entrance    Number of Stairs, Main Entrance two  -ES       Row Name 12/10/23 0951          Stairs Within Home, Primary    Number of Stairs, Within Home, Primary none  -ES       Row Name 12/10/23 0951          Cognition    Orientation Status (Cognition) oriented x 3  -ES       Row Name 12/10/23 0951          Safety Issues, Functional Mobility    Safety Issues Affecting  Function (Mobility) awareness of need for assistance;insight into deficits/self-awareness;safety precaution awareness;safety precautions follow-through/compliance  -ES     Impairments Affecting Function (Mobility) balance;endurance/activity tolerance;shortness of breath;strength  -ES               User Key  (r) = Recorded By, (t) = Taken By, (c) = Cosigned By      Initials Name Provider Type    ES Kristina Adam PT Physical Therapist                   Mobility       Row Name 12/10/23 0953          Bed Mobility    Comment, (Bed Mobility) UIC  -ES       Row Name 12/10/23 0953          Sit-Stand Transfer    Sit-Stand [place] (Transfers) minimum assist (75% patient effort);2 person assist;verbal cues  -ES     Assistive Device (Sit-Stand Transfers) other (see comments)  BUE support  -ES       Row Name 12/10/23 0953          Gait/Stairs (Locomotion)    [place] Level (Gait) minimum assist (75% patient effort);2 person assist;verbal cues  -ES     Assistive Device (Gait) other (see comments)  BUE support  -ES     Patient was able to Ambulate yes  -ES     Distance in Feet (Gait) 10  -ES     Deviations/Abnormal Patterns (Gait) bilateral deviations;gait speed decreased;stride length decreased  -ES     Bilateral Gait Deviations forward flexed posture;heel strike decreased  -ES     Comment, (Gait/Stairs) Pt amb 10' with Ramiro x2 and BUE support. Demo'd forward flexed posture with decreased stride length and decreased weight shifting bilaterally. No LOB, further mobility limited by fatigue.  -ES               User Key  (r) = Recorded By, (t) = Taken By, (c) = Cosigned By      Initials Name Provider Type    ES Kristina Adam PT Physical Therapist                   Obj/Interventions       Row Name 12/10/23 0954          Range of Motion Comprehensive    General Range of Motion bilateral lower extremity ROM WFL  -ES       Row Name 12/10/23 0954          Strength Comprehensive (MMT)    General Manual Muscle Testing (MMT)  Assessment lower extremity strength deficits identified  -ES     Comment, General Manual Muscle Testing (MMT) Assessment BLE grossly 4/5  -ES       Row Name 12/10/23 0954          Balance    Balance Assessment sitting dynamic balance;sitting static balance;sit to stand dynamic balance;standing static balance;standing dynamic balance  -ES     Static Sitting Balance contact guard  -ES     Dynamic Sitting Balance contact guard;verbal cues  -ES     Position, Sitting Balance supported;sitting in chair  -ES     Sit to Stand Dynamic Balance minimal assist;2-person assist;verbal cues  -ES     Static Standing Balance minimal assist;verbal cues  -ES     Dynamic Standing Balance minimal assist;2-person assist;verbal cues  -ES     Position/Device Used, Standing Balance supported;other (see comments)  BUE support  -ES     Balance Interventions sitting;standing;sit to stand;supported;static;dynamic;occupation based/functional task  -ES     Comment, Balance no LOB  -ES       Row Name 12/10/23 0954          Sensory Assessment (Somatosensory)    Sensory Assessment (Somatosensory) bilateral LE  -ES     Bilateral LE Sensory Assessment impaired  -ES               User Key  (r) = Recorded By, (t) = Taken By, (c) = Cosigned By      Initials Name Provider Type    ES Kristina Adam, PT Physical Therapist                   Goals/Plan       Row Name 12/10/23 0923          Bed Mobility Goal 1 (PT)    Activity/Assistive Device (Bed Mobility Goal 1, PT) sit to supine/supine to sit  -ES     [place] Level/Cues Needed (Bed Mobility Goal 1, PT) standby assist  -ES     Time Frame (Bed Mobility Goal 1, PT) long term goal (LTG);10 days  -ES       Row Name 12/10/23 0956          Transfer Goal 1 (PT)    Activity/Assistive Device (Transfer Goal 1, PT) sit-to-stand/stand-to-sit;bed-to-chair/chair-to-bed;walker, rolling  -ES     [place] Level/Cues Needed (Transfer Goal 1, PT) standby assist  -ES     Time Frame (Transfer Goal 1, PT) long term  goal (LTG);10 days  -ES       Row Name 12/10/23 0956          Gait Training Goal 1 (PT)    Activity/Assistive Device (Gait Training Goal 1, PT) gait (walking locomotion);assistive device use;walker, rolling  -ES     [place] Level (Gait Training Goal 1, PT) standby assist  -ES     Distance (Gait Training Goal 1, PT) 150  -ES     Time Frame (Gait Training Goal 1, PT) long term goal (LTG);10 days  -ES       Row Name 12/10/23 0956          Stairs Goal 1 (PT)    Activity/Assistive Device (Stairs Goal 1, PT) ascending stairs;descending stairs  -ES     [place] Level/Cues Needed (Stairs Goal 1, PT) contact guard required  -ES     Number of Stairs (Stairs Goal 1, PT) 2  -ES     Time Frame (Stairs Goal 1, PT) long term goal (LTG);by discharge  -ES       Row Name 12/10/23 0943          Therapy Assessment/Plan (PT)    Planned Therapy Interventions (PT) balance training;bed mobility training;gait training;neuromuscular re-education;patient/family education;strengthening;stair training;transfer training;postural re-education  -ES               User Key  (r) = Recorded By, (t) = Taken By, (c) = Cosigned By      Initials Name Provider Type    ES Kristina Adam PT Physical Therapist                   Clinical Impression       Row Name 12/10/23 0992          Pain    Pretreatment Pain Rating 0/10 - no pain  -ES     Posttreatment Pain Rating 0/10 - no pain  -ES     Pre/Posttreatment Pain Comment tolerated  -ES     Pain Intervention(s) Repositioned;Ambulation/increased activity  -ES       Row Name 12/10/23 0905          Plan of Care Review    Plan of Care Reviewed With patient  -ES     Progress no change  PT IE  -ES     Outcome Evaluation PT eval complete. Pt presents with generalized weakness, mild balance deficits, and decreased functional activity tolerance warranting skilled IPPT services. Pt required Ramiro x2 for functional mobility and demonstrated good effort throughout session. PT rec return to SNF at d/c.  -ES        Row Name 12/10/23 0954          Therapy Assessment/Plan (PT)    Rehab Potential (PT) good, to achieve stated therapy goals  -ES     Criteria for Skilled Interventions Met (PT) yes;meets criteria;skilled treatment is necessary  -ES     Therapy Frequency (PT) daily  -ES       Row Name 12/10/23 0954          Vital Signs    Pre Systolic BP Rehab 145  -ES     Pre Treatment Diastolic BP 63  -ES     Post Systolic BP Rehab 150  -ES     Post Treatment Diastolic BP 68  -ES     Pretreatment Heart Rate (beats/min) 74  -ES     Posttreatment Heart Rate (beats/min) 75  -ES     Pre SpO2 (%) 96  -ES     O2 Delivery Pre Treatment nasal cannula  -ES     O2 Delivery Intra Treatment nasal cannula  -ES     Post SpO2 (%) 96  -ES     O2 Delivery Post Treatment nasal cannula  -ES     Pre Patient Position Sitting  -ES     Intra Patient Position Standing  -ES     Post Patient Position Sitting  -ES       Row Name 12/10/23 0954          Positioning and Restraints    Pre-Treatment Position sitting in chair/recliner  -ES     Post Treatment Position chair  -ES     In Chair notified nsg;reclined;sitting;encouraged to call for assist;exit alarm on;call light within reach;waffle cushion;legs elevated  -ES               User Key  (r) = Recorded By, (t) = Taken By, (c) = Cosigned By      Initials Name Provider Type    ES Kristina Adam, PT Physical Therapist                   Outcome Measures       Row Name 12/10/23 0957          How much help from another person do you currently need...    Turning from your back to your side while in flat bed without using bedrails? 3  -ES     Moving from lying on back to sitting on the side of a flat bed without bedrails? 3  -ES     Moving to and from a bed to a chair (including a wheelchair)? 3  -ES     Standing up from a chair using your arms (e.g., wheelchair, bedside chair)? 3  -ES     Climbing 3-5 steps with a railing? 2  -ES     To walk in hospital room? 2  -ES     AM-PAC 6 Clicks Score (PT) 16  -ES      Highest Level of Mobility Goal 5 --> Static standing  -ES       Row Name 12/10/23 0957          Functional Assessment    Outcome Measure Options AM-PAC 6 Clicks Basic Mobility (PT)  -ES               User Key  (r) = Recorded By, (t) = Taken By, (c) = Cosigned By      Initials Name Provider Type    ES Kristina Adam PT Physical Therapist                                 Physical Therapy Education       Title: PT OT SLP Therapies (In Progress)       Topic: Physical Therapy (In Progress)       Point: Mobility training (Done)       Learning Progress Summary             Patient Acceptance, E,TB, VU by ES at 12/10/2023 0958                         Point: Home exercise program (Not Started)       Learner Progress:  Not documented in this visit.              Point: Body mechanics (Done)       Learning Progress Summary             Patient Acceptance, E,TB, VU by ES at 12/10/2023 0958                         Point: Precautions (Done)       Learning Progress Summary             Patient Acceptance, E,TB, VU by ES at 12/10/2023 0958                                         User Key       Initials Effective Dates Name Provider Type Discipline     08/11/22 -  Kristina Adam PT Physical Therapist PT                  PT Recommendation and Plan  Planned Therapy Interventions (PT): balance training, bed mobility training, gait training, neuromuscular re-education, patient/family education, strengthening, stair training, transfer training, postural re-education  Plan of Care Reviewed With: patient  Progress: no change (PT IE)  Outcome Evaluation: PT eval complete. Pt presents with generalized weakness, mild balance deficits, and decreased functional activity tolerance warranting skilled IPPT services. Pt required Ramiro x2 for functional mobility and demonstrated good effort throughout session. PT rec return to SNF at d/c.     Time Calculation:   PT Evaluation Complexity  History, PT Evaluation Complexity: 1-2 personal factors  and/or comorbidities  Examination of Body Systems (PT Eval Complexity): total of 3 or more elements  Clinical Presentation (PT Evaluation Complexity): stable  Clinical Decision Making (PT Evaluation Complexity): low complexity  Overall Complexity (PT Evaluation Complexity): low complexity     PT Charges       Row Name 12/10/23 0958             Time Calculation    Start Time 0840  -ES      PT Received On 12/10/23  -ES      PT Goal Re-Cert Due Date 12/20/23  -ES         Time Calculation- PT    Total Timed Code Minutes- PT 8 minute(s)  -ES         Timed Charges    49410 - Gait Training Minutes  8  -ES         Untimed Charges    PT Eval/Re-eval Minutes 47  -ES         Total Minutes    Timed Charges Total Minutes 8  -ES      Untimed Charges Total Minutes 47  -ES       Total Minutes 55  -ES                User Key  (r) = Recorded By, (t) = Taken By, (c) = Cosigned By      Initials Name Provider Type    ES Kristina Adam, PT Physical Therapist                  Therapy Charges for Today       Code Description Service Date Service Provider Modifiers Qty    50177021024 HC GAIT TRAINING EA 15 MIN 12/10/2023 Kristina Adam, PT GP 1    76099632575 HC PT EVAL LOW COMPLEXITY 4 12/10/2023 Kristina Adam, PT GP 1            PT G-Codes  Outcome Measure Options: AM-PAC 6 Clicks Basic Mobility (PT)  AM-PAC 6 Clicks Score (PT): 16  PT Discharge Summary  Anticipated Discharge Disposition (PT): skilled nursing facility    Kristina Adam PT  12/10/2023

## 2023-12-10 NOTE — PLAN OF CARE
Goal Outcome Evaluation:  Plan of Care Reviewed With: patient           Outcome Evaluation: OT initial eval and expanded chart review completed. Pt presents with multiple comorbidities and decreased strength, balance and activity tolerance limiting independence with ADL's and mobility from recent baseline. Recommend continued skilled OT services and transfer to SNF at d/c if deemed medically necessary to ensure safe transition to home.      Anticipated Discharge Disposition (OT): skilled nursing facility

## 2023-12-10 NOTE — PLAN OF CARE
Goal Outcome Evaluation:                      -Patient on 1 L NC  -Up in chair; transferred to bed for sleep  -BM x1  -VSS

## 2023-12-10 NOTE — PLAN OF CARE
Goal Outcome Evaluation:  Plan of Care Reviewed With: patient        Progress: no change (PT IE)  Outcome Evaluation: PT eval complete. Pt presents with generalized weakness, mild balance deficits, and decreased functional activity tolerance warranting skilled IPPT services. Pt required Ramiro x2 for functional mobility and demonstrated good effort throughout session. PT rec return to SNF at d/c.      Anticipated Discharge Disposition (PT): skilled nursing facility

## 2023-12-11 LAB
ALBUMIN SERPL-MCNC: 3.2 G/DL (ref 3.5–5.2)
ALBUMIN/GLOB SERPL: 1.6 G/DL
ALP SERPL-CCNC: 59 U/L (ref 39–117)
ALT SERPL W P-5'-P-CCNC: 9 U/L (ref 1–33)
ANION GAP SERPL CALCULATED.3IONS-SCNC: 6 MMOL/L (ref 5–15)
AST SERPL-CCNC: 12 U/L (ref 1–32)
BILIRUB SERPL-MCNC: 0.2 MG/DL (ref 0–1.2)
BUN SERPL-MCNC: 19 MG/DL (ref 8–23)
BUN/CREAT SERPL: 28.8 (ref 7–25)
CALCIUM SPEC-SCNC: 8.8 MG/DL (ref 8.6–10.5)
CHLORIDE SERPL-SCNC: 101 MMOL/L (ref 98–107)
CO2 SERPL-SCNC: 36 MMOL/L (ref 22–29)
CREAT SERPL-MCNC: 0.66 MG/DL (ref 0.57–1)
DEPRECATED RDW RBC AUTO: 46.8 FL (ref 37–54)
EGFRCR SERPLBLD CKD-EPI 2021: 89.9 ML/MIN/1.73
ERYTHROCYTE [DISTWIDTH] IN BLOOD BY AUTOMATED COUNT: 13.9 % (ref 12.3–15.4)
GLOBULIN UR ELPH-MCNC: 2 GM/DL
GLUCOSE BLDC GLUCOMTR-MCNC: 142 MG/DL (ref 70–130)
GLUCOSE BLDC GLUCOMTR-MCNC: 176 MG/DL (ref 70–130)
GLUCOSE BLDC GLUCOMTR-MCNC: 222 MG/DL (ref 70–130)
GLUCOSE BLDC GLUCOMTR-MCNC: 88 MG/DL (ref 70–130)
GLUCOSE SERPL-MCNC: 85 MG/DL (ref 65–99)
HCT VFR BLD AUTO: 28.2 % (ref 34–46.6)
HGB BLD-MCNC: 8.4 G/DL (ref 12–15.9)
MCH RBC QN AUTO: 27.7 PG (ref 26.6–33)
MCHC RBC AUTO-ENTMCNC: 29.8 G/DL (ref 31.5–35.7)
MCV RBC AUTO: 93.1 FL (ref 79–97)
PLATELET # BLD AUTO: 240 10*3/MM3 (ref 140–450)
PMV BLD AUTO: 10.9 FL (ref 6–12)
POTASSIUM SERPL-SCNC: 3.6 MMOL/L (ref 3.5–5.2)
POTASSIUM SERPL-SCNC: 5 MMOL/L (ref 3.5–5.2)
PROT SERPL-MCNC: 5.2 G/DL (ref 6–8.5)
RBC # BLD AUTO: 3.03 10*6/MM3 (ref 3.77–5.28)
SODIUM SERPL-SCNC: 143 MMOL/L (ref 136–145)
WBC NRBC COR # BLD AUTO: 4.89 10*3/MM3 (ref 3.4–10.8)

## 2023-12-11 PROCEDURE — 97535 SELF CARE MNGMENT TRAINING: CPT

## 2023-12-11 PROCEDURE — 84132 ASSAY OF SERUM POTASSIUM: CPT | Performed by: INTERNAL MEDICINE

## 2023-12-11 PROCEDURE — 82948 REAGENT STRIP/BLOOD GLUCOSE: CPT

## 2023-12-11 PROCEDURE — 99232 SBSQ HOSP IP/OBS MODERATE 35: CPT | Performed by: INTERNAL MEDICINE

## 2023-12-11 PROCEDURE — 97530 THERAPEUTIC ACTIVITIES: CPT

## 2023-12-11 PROCEDURE — 85027 COMPLETE CBC AUTOMATED: CPT | Performed by: INTERNAL MEDICINE

## 2023-12-11 PROCEDURE — 25810000003 SODIUM CHLORIDE 0.9 % SOLUTION 250 ML FLEX CONT

## 2023-12-11 PROCEDURE — 80053 COMPREHEN METABOLIC PANEL: CPT | Performed by: INTERNAL MEDICINE

## 2023-12-11 PROCEDURE — 63710000001 INSULIN REGULAR HUMAN PER 5 UNITS: Performed by: NURSE PRACTITIONER

## 2023-12-11 PROCEDURE — 25010000002 ENOXAPARIN PER 10 MG: Performed by: INTERNAL MEDICINE

## 2023-12-11 PROCEDURE — 63710000001 DEXAMETHASONE PER 0.25 MG: Performed by: INTERNAL MEDICINE

## 2023-12-11 PROCEDURE — 25010000002 REMDESIVIR 100 MG/20ML SOLUTION 1 EACH VIAL

## 2023-12-11 RX ORDER — POTASSIUM CHLORIDE 20 MEQ/1
40 TABLET, EXTENDED RELEASE ORAL EVERY 4 HOURS
Status: COMPLETED | OUTPATIENT
Start: 2023-12-11 | End: 2023-12-11

## 2023-12-11 RX ORDER — LISINOPRIL 40 MG/1
40 TABLET ORAL DAILY
Status: DISCONTINUED | OUTPATIENT
Start: 2023-12-11 | End: 2023-12-13 | Stop reason: HOSPADM

## 2023-12-11 RX ADMIN — Medication 10 ML: at 20:05

## 2023-12-11 RX ADMIN — Medication 10 ML: at 08:39

## 2023-12-11 RX ADMIN — SENNOSIDES AND DOCUSATE SODIUM 2 TABLET: 8.6; 5 TABLET ORAL at 08:38

## 2023-12-11 RX ADMIN — ENOXAPARIN SODIUM 40 MG: 100 INJECTION SUBCUTANEOUS at 08:39

## 2023-12-11 RX ADMIN — GABAPENTIN 300 MG: 300 CAPSULE ORAL at 08:38

## 2023-12-11 RX ADMIN — LISINOPRIL 40 MG: 40 TABLET ORAL at 10:46

## 2023-12-11 RX ADMIN — AMLODIPINE BESYLATE 10 MG: 10 TABLET ORAL at 08:38

## 2023-12-11 RX ADMIN — GABAPENTIN 300 MG: 300 CAPSULE ORAL at 20:29

## 2023-12-11 RX ADMIN — ENOXAPARIN SODIUM 40 MG: 100 INJECTION SUBCUTANEOUS at 20:29

## 2023-12-11 RX ADMIN — REMDESIVIR 100 MG: 100 INJECTION, POWDER, LYOPHILIZED, FOR SOLUTION INTRAVENOUS at 17:21

## 2023-12-11 RX ADMIN — POTASSIUM CHLORIDE 40 MEQ: 1500 TABLET, EXTENDED RELEASE ORAL at 12:43

## 2023-12-11 RX ADMIN — LEVOTHYROXINE SODIUM 50 MCG: 0.05 TABLET ORAL at 06:19

## 2023-12-11 RX ADMIN — FUROSEMIDE 40 MG: 40 TABLET ORAL at 08:38

## 2023-12-11 RX ADMIN — POTASSIUM CHLORIDE 40 MEQ: 1500 TABLET, EXTENDED RELEASE ORAL at 08:38

## 2023-12-11 RX ADMIN — INSULIN HUMAN 3 UNITS: 100 INJECTION, SOLUTION PARENTERAL at 17:21

## 2023-12-11 RX ADMIN — INSULIN HUMAN 2 UNITS: 100 INJECTION, SOLUTION PARENTERAL at 20:33

## 2023-12-11 RX ADMIN — SENNOSIDES AND DOCUSATE SODIUM 2 TABLET: 8.6; 5 TABLET ORAL at 20:32

## 2023-12-11 RX ADMIN — DEXAMETHASONE 6 MG: 4 TABLET ORAL at 08:38

## 2023-12-11 NOTE — PLAN OF CARE
Goal Outcome Evaluation:  Plan of Care Reviewed With: patient           Outcome Evaluation: Well rested this evening with no distress noted.  BP normalized after second dose of previously ordered amlodipine was given.  UOP acceptable.

## 2023-12-11 NOTE — PLAN OF CARE
Goal Outcome Evaluation:  Plan of Care Reviewed With: patient        Progress: improving  Outcome Evaluation: Pt demo improved independence by performing t/fs and functional mobility w/ CGA w/ RW and LBD tasks w/ Supervision. Pt conts to be limited d/t generalized weakness and decreased activity tolerance though demo excellent effort. Recommend cont skilled IPOT POC to promote return to PLOF. Recommend pt DC home w/ 24/7 assist, HH OT/PT services, and a RW.      Anticipated Discharge Disposition (OT): home with 24/7 care, home with home health

## 2023-12-11 NOTE — THERAPY TREATMENT NOTE
Patient Name: Mathew Crabtree  : 1945    MRN: [id number]                              Today's Date: 2023       Admit Date: 2023    Visit Dx:     ICD-10-CM ICD-9-CM   1. COVID-19  U07.1 079.89   2. Hypoxia  R09.02 799.02   3. Fatigue, unspecified type  R53.83 780.79   4. Hypercapnia  R06.89 786.09     Patient Active Problem List   Diagnosis    Acute hypercapnic respiratory failure    COVID-19 virus detected    Elevated brain natriuretic peptide (BNP) level    FRAN (acute kidney injury)    Sepsis    Type 2 diabetes mellitus    Anemia    Acute hypoxemic respiratory failure due to COVID-19     Past Medical History:   Diagnosis Date    Diabetes mellitus     Hypertension     Ischemia of small intestine      History reviewed. No pertinent surgical history.   General Information       Row Name 23 1456          OT Time and Intention    Document Type therapy note (daily note)  -CS     Mode of Treatment occupational therapy  -CS       Row Name 23 1456          General Information    Existing Precautions/Restrictions fall  -CS     Barriers to Rehab medically complex;previous functional deficit  -CS       Row Name 23 1456          Cognition    Orientation Status (Cognition) oriented x 3  -CS       Row Name 23 1456          Safety Issues, Functional Mobility    Impairments Affecting Function (Mobility) balance;endurance/activity tolerance;shortness of breath;strength  -CS               User Key  (r) = Recorded By, (t) = Taken By, (c) = Cosigned By      Initials Name Provider Type    CS Kimi Woodward OT Occupational Therapist                     Mobility/ADL's       Row Name 23 1457          Bed Mobility    Bed Mobility supine-sit  -CS     Supine-Sit [place] (Bed Mobility) contact guard;verbal cues  -CS     Assistive Device (Bed Mobility) bed rails;head of bed elevated  -CS       Row Name 23 1457          Transfers    Transfers sit-stand transfer;stand-sit transfer  -CS      Comment, (Transfers) STSx3 reps  -CS       Row Name 12/11/23 1457          Bed-Chair Transfer    Bed-Chair [place] (Transfers) contact guard;verbal cues  -CS     Assistive Device (Bed-Chair Transfers) walker, front-wheeled  -CS       Row Name 12/11/23 1457          Sit-Stand Transfer    Sit-Stand [place] (Transfers) contact guard;verbal cues  -CS     Assistive Device (Sit-Stand Transfers) walker, front-wheeled  -CS       Row Name 12/11/23 1457          Stand-Sit Transfer    Stand-Sit [place] (Transfers) contact guard;verbal cues  -CS     Assistive Device (Stand-Sit Transfers) walker, front-wheeled  -CS       Row Name 12/11/23 1457          Functional Mobility    Functional Mobility- Ind. Level contact guard assist;verbal cues required  -CS     Functional Mobility- Device walker, front-wheeled  -CS     Functional Mobility-Distance (Feet) --  household distance x2 reps  -CS       Row Name 12/11/23 1457          Activities of Daily Living    BADL Assessment/Intervention lower body dressing;grooming  -       Row Name 12/11/23 1457          Lower Body Dressing Assessment/Training    [place] Level (Lower Body Dressing) don;socks;supervision  -CS     Position (Lower Body Dressing) sitting up in bed  -CS       Row Name 12/11/23 1457          Grooming Assessment/Training    [place] Level (Grooming) wash face, hands;set up  -CS     Position (Grooming) sitting up in bed  -               User Key  (r) = Recorded By, (t) = Taken By, (c) = Cosigned By      Initials Name Provider Type     Kimi Woodward OT Occupational Therapist                   Obj/Interventions       Row Name 12/11/23 1458          Shoulder (Therapeutic Exercise)    Shoulder (Therapeutic Exercise) AROM (active range of motion)  -     Shoulder AROM (Therapeutic Exercise) bilateral;flexion;aBduction;aDduction;10 repetitions  -       Row Name 12/11/23 1458          Elbow/Forearm (Therapeutic Exercise)    Elbow/Forearm  (Therapeutic Exercise) AROM (active range of motion)  -     Elbow/Forearm AROM (Therapeutic Exercise) bilateral;flexion;extension;10 repetitions  -       Row Name 12/11/23 1458          Motor Skills    Therapeutic Exercise shoulder;elbow/forearm  -       Row Name 12/11/23 1458          Balance    Balance Assessment sitting static balance;sitting dynamic balance;standing static balance;standing dynamic balance  -     Static Sitting Balance supervision  -     Dynamic Sitting Balance supervision  -     Position, Sitting Balance sitting in chair  -     Static Standing Balance standby assist  -     Dynamic Standing Balance contact guard  -     Position/Device Used, Standing Balance walker, rolling  -CS     Balance Interventions standing;sitting;static;dynamic;dynamic reaching;occupation based/functional task;weight shifting activity  -               User Key  (r) = Recorded By, (t) = Taken By, (c) = Cosigned By      Initials Name Provider Type    CS Kimi Woodward, OT Occupational Therapist                   Goals/Plan    No documentation.                  Clinical Impression       Row Name 12/11/23 1459          Pain Assessment    Pretreatment Pain Rating 0/10 - no pain  -     Posttreatment Pain Rating 0/10 - no pain  -       Row Name 12/11/23 1459          Plan of Care Review    Plan of Care Reviewed With patient  -     Progress improving  -     Outcome Evaluation Pt demo improved independence by performing t/fs and functional mobility w/ CGA w/ RW and LBD tasks w/ Supervision. Pt conts to be limited d/t generalized weakness and decreased activity tolerance though demo excellent effort. Recommend cont skilled IPOT POC to promote return to PLOF. Recommend pt DC home w/ 24/7 assist, HH OT/PT services, and a RW.  -       Row Name 12/11/23 1459          Therapy Assessment/Plan (OT)    Therapy Frequency (OT) daily  -       Row Name 12/11/23 1459          Therapy Plan Review/Discharge Plan  (OT)    Anticipated Discharge Disposition (OT) home with 24/7 care;home with home health  -CS       Row Name 12/11/23 1459          Vital Signs    Pre SpO2 (%) 92  -CS     O2 Delivery Pre Treatment room air  -CS     Intra SpO2 (%) 88  -CS     O2 Delivery Intra Treatment room air  -CS     Post SpO2 (%) 94  -CS     O2 Delivery Post Treatment room air  -CS     Pre Patient Position Supine  -CS     Intra Patient Position Standing  -CS     Post Patient Position Sitting  -CS       Row Name 12/11/23 1459          Positioning and Restraints    Pre-Treatment Position in bed  -CS     Post Treatment Position chair  -CS     In Chair notified nsg;reclined;call light within reach;encouraged to call for assist;RUE elevated;LUE elevated;waffle cushion;with nsg;legs elevated  no exit alarm box in room, RN notified  -CS               User Key  (r) = Recorded By, (t) = Taken By, (c) = Cosigned By      Initials Name Provider Type    Kimi Mendiola OT Occupational Therapist                   Outcome Measures       Row Name 12/11/23 1502          How much help from another is currently needed...    Putting on and taking off regular lower body clothing? 3  -CS     Bathing (including washing, rinsing, and drying) 2  -CS     Toileting (which includes using toilet bed pan or urinal) 3  -CS     Putting on and taking off regular upper body clothing 3  -CS     Taking care of personal grooming (such as brushing teeth) 3  -CS     Eating meals 4  -CS     AM-PAC 6 Clicks Score (OT) 18  -CS       Row Name 12/11/23 1502          Functional Assessment    Outcome Measure Options AM-PAC 6 Clicks Daily Activity (OT)  -CS               User Key  (r) = Recorded By, (t) = Taken By, (c) = Cosigned By      Initials Name Provider Type    Kimi Mendiola OT Occupational Therapist                    Occupational Therapy Education       Title: PT OT SLP Therapies (In Progress)       Topic: Occupational Therapy (In Progress)       Point: ADL training (In  Progress)       Description:   Instruct learner(s) on proper safety adaptation and remediation techniques during self care or transfers.   Instruct in proper use of assistive devices.                  Learning Progress Summary             Patient Acceptance, E, NR by CS at 12/11/2023 1503    Acceptance, E, NR by  at 12/10/2023 0825    Comment: Educated pt regarding role of therapy and ongoing treatment plan                         Point: Home exercise program (In Progress)       Description:   Instruct learner(s) on appropriate technique for monitoring, assisting and/or progressing therapeutic exercises/activities.                  Learning Progress Summary             Patient Acceptance, E, NR by CS at 12/11/2023 1503                         Point: Precautions (In Progress)       Description:   Instruct learner(s) on prescribed precautions during self-care and functional transfers.                  Learning Progress Summary             Patient Acceptance, E, NR by CS at 12/11/2023 1503                         Point: Body mechanics (In Progress)       Description:   Instruct learner(s) on proper positioning and spine alignment during self-care, functional mobility activities and/or exercises.                  Learning Progress Summary             Patient Acceptance, E, NR by CS at 12/11/2023 1503                                         User Key       Initials Effective Dates Name Provider Type Discipline     02/03/23 -  Maddi Lazo, OT Occupational Therapist OT     09/02/21 -  Kimi Woodward OT Occupational Therapist OT                  OT Recommendation and Plan  Therapy Frequency (OT): daily  Plan of Care Review  Plan of Care Reviewed With: patient  Progress: improving  Outcome Evaluation: Pt demo improved independence by performing t/fs and functional mobility w/ CGA w/ RW and LBD tasks w/ Supervision. Pt conts to be limited d/t generalized weakness and decreased activity tolerance though demo  excellent effort. Recommend cont skilled IPOT POC to promote return to PLOF. Recommend pt DC home w/ 24/7 assist, HH OT/PT services, and a RW.     Time Calculation:         Time Calculation- OT       Row Name 12/11/23 1504             Time Calculation- OT    OT Start Time 1101  -CS      OT Received On 12/11/23  -CS      OT Goal Re-Cert Due Date 12/20/23  -CS         Timed Charges    76204 - OT Therapeutic Activity Minutes 15  -CS      70169 - OT Self Care/Mgmt Minutes 15  -CS         Total Minutes    Timed Charges Total Minutes 30  -CS       Total Minutes 30  -CS                User Key  (r) = Recorded By, (t) = Taken By, (c) = Cosigned By      Initials Name Provider Type    CS Kiim Woodward OT Occupational Therapist                  Therapy Charges for Today       Code Description Service Date Service Provider Modifiers Qty    59013605296 HC OT THERAPEUTIC ACT EA 15 MIN 12/11/2023 Kimi Woodward OT GO 1    64925208595 HC OT SELF CARE/MGMT/TRAIN EA 15 MIN 12/11/2023 Kimi Woodward OT GO 1                 Kimi Woodward OT  12/11/2023

## 2023-12-11 NOTE — CASE MANAGEMENT/SOCIAL WORK
Continued Stay Note   Josey     Patient Name: Mathew Crabtree  MRN: [id number]  Today's Date: 12/11/2023    Admit Date: 12/7/2023    Plan: Ongoing   Discharge Plan       Row Name 12/11/23 1033       Plan    Plan Ongoing    Patient/Family in Agreement with Plan yes    Plan Comments Therapy has worked with Ms. Crabtree and they are recommending rehab. I will speak with Ms. Crabtree to see if she is agreeable with this plan and where she would like referrals sent. CM continues to follow.    Final Discharge Disposition Code 30 - still a patient                   Discharge Codes    No documentation.                 Expected Discharge Date and Time       Expected Discharge Date Expected Discharge Time    Dec 15, 2023               Shola Adhikari RN

## 2023-12-11 NOTE — PROGRESS NOTES
"INTENSIVIST   PROGRESS NOTE     Hospital:  LOS: 4 days     Chief Complaint: Respiratory failure     Subjective   S     Interval History: No acute issues overnight.  Afebrile, hemodynamically stable.  Sitting upright in bedside chair eating lunch during assessment.  I just attempted room air trial at time of evaluation with oxygen saturations in the low to mid 90s.    The patient's relevant past medical, surgical and social history were reviewed and updated in Epic as appropriate.      Objective   O     Intake/Ouptut 24 hrs (7:00AM - 6:59 AM)  Intake & Output (last 3 days)         12/08 0701  12/09 0700 12/09 0701  12/10 0700 12/10 0701  12/11 0700 12/11 0701  12/12 0700    P.O. 250 960 500 720    I.V. (mL/kg) 48 (0.5) 15.9 (0.2)      IV Piggyback  250      Total Intake(mL/kg) 298 (2.9) 1225.9 (11.8) 500 (4.8) 720 (6.9)    Urine (mL/kg/hr) 950 (0.4) 650 (0.3) 1950 (0.8) 2350 (2.3)    Stool  0 0     Total Output  2350    Net -652 +575.9 -1450 -1630            Urine Unmeasured Occurrence 1 x 1 x 2 x     Stool Unmeasured Occurrence  2 x 2 x           Respiratory Support: Room air     Physical Examination:  Vital Signs: Blood pressure 129/72, pulse 73, temperature 98.4 °F (36.9 °C), temperature source Oral, resp. rate 16, height 165.1 cm (65\"), weight 104 kg (229 lb 11.5 oz), SpO2 97%.    General: The patient appears in no acute distress. Alert, cooperative and interactive.  Chest: Clear to auscultation bilaterally, No wheezing, rhonchi, or rales. Normal work of breathing. Equal chest rise.  Cardiac: Regular rhythm, normal rate, S1S2 auscultated. No murmurs, rubs or gallops.   Abdomen: Soft, non-tender, non-distended, positive bowel sounds in all four quadrants.   Extremities: No lower extremity edema. No clubbing or cyanosis.  Skin: No rashes, open wounds, or bruising. Warm, dry, well-perfused.  Neuro: Motor power grossly intact bilaterally. Sensation intact. Speech fluid and fluent. Thought process " coherent.  Psych: Alert and oriented x3. Mood stable.    Lines, Drains & Airways       Active LDAs       Name Placement date Placement time Site Days    Peripheral IV 12/07/23 1457 Left Antecubital 12/07/23  1457  Antecubital  4    Peripheral IV 12/11/23 1015 Anterior;Distal;Left Wrist 12/11/23  1015  Wrist  less than 1    External Urinary Catheter 12/07/23 2030  --  3             Results from last 7 days   Lab Units 12/11/23  0425 12/10/23  0323 12/09/23  0307   WBC 10*3/mm3 4.89 5.44 6.36   HEMOGLOBIN g/dL 8.4* 8.4* 8.5*   MCV fL 93.1 94.0 94.3   PLATELETS 10*3/mm3 240 241 222     Results from last 7 days   Lab Units 12/11/23  1534 12/11/23  0425 12/10/23  0323 12/09/23  0307 12/08/23  0355 12/07/23  1541   SODIUM mmol/L  --  143 143 145   < > 143   POTASSIUM mmol/L 5.0 3.6 3.7 3.8   < > 4.1   CO2 mmol/L  --  36.0* 35.0* 36.0*   < > 34.0*   CREATININE mg/dL  --  0.66 0.81 0.85   < > 1.22*   GLUCOSE mg/dL  --  85 104* 135*   < > 128*   MAGNESIUM mg/dL  --   --   --  1.8  --  1.7   PHOSPHORUS mg/dL  --   --   --  3.6  --   --     < > = values in this interval not displayed.     Estimated Creatinine Clearance: 84.1 mL/min (by C-G formula based on SCr of 0.66 mg/dL).  Results from last 7 days   Lab Units 12/11/23  0425 12/10/23  0323 12/09/23  0307   ALK PHOS U/L 59 61 68   BILIRUBIN mg/dL 0.2 0.2 0.2   ALT (SGPT) U/L 9 8 8   AST (SGOT) U/L 12 13 15       Results from last 7 days   Lab Units 12/09/23  1923 12/08/23  0719 12/08/23  0353 12/07/23  1756 12/07/23  1620   PH, ARTERIAL pH units 7.550* 7.325* 7.305* 7.358 7.322*   PCO2, ARTERIAL mm Hg 41.3 71.9* 75.5* 66.8* 73.0*   PO2 ART mm Hg 92.6 134.0* 118.0* 119.0* 42.4*   FIO2 % 28 45 50 40 28     Results: Reviewed.  - I reviewed the patient's new laboratory and imaging results  - Independently reviewed the patient's new images    Medications: Reviewed.    Assessment & Plan    A / P     Active Hospital Problems    Diagnosis     **Acute hypercapnic respiratory failure      COVID-19 virus detected     Elevated brain natriuretic peptide (BNP) level     FRAN (acute kidney injury)     Sepsis     Type 2 diabetes mellitus     Anemia     Acute hypoxemic respiratory failure due to COVID-19      Patient Bro is a 78 y.o. male with history of HTN, T2DM, recent small intestinal ischemia presented to [facility] ED on 12/7/2023 with weakness, fever, and hypoxemia.  The patient lives at the [facility] at [place] and was found to have increased confusion, SOA weakness and fever.        Patient had combined hypoxemic and hypercapnic respiratory failure. She has required oxygen and has generally been maintained with a normal oxygen saturation on supplemental oxygen but does have a documented oxygen saturation of 87% on supplemental oxygen meeting the criteria for hypoxemic respiratory failure.  ABG initially had a pCO2 of 73 with a pH of 7.32 consistent with hypercapnic respiratory failure.     Patient was admitted to the ICU on BiPAP for respiratory failure. She has been treated with remdesivir and dexamethasone for COVID-pneumonia.     Chest x-ray shows elevated right hemidiaphragm.  CT chest showed some mild right base atelectasis but no evidence of endobronchial lesion.  There is no evidence of pulmonary embolism.  ABG on 12/9/2023 was within normal limits.     Patient patient seems definitively improved today.  I am going to order her to telemetry/hospitalist.  She will probably be ready for transfer to rehabilitation soon.    - For acute hypoxemic and hypercapnic respiratory failure secondary to COVID-pneumonia: BiPAP as needed.  Currently on low-dose nasal cannula versus room air alternating.  CTA chest with just some mild right base atelectasis.  Repeat ABG on 12/9/2023 was essentially normal off of BiPAP greater than 24 hours.  Continue dexamethasone and remdesivir  - For hypertension: Slightly elevated.  Restart home amlodipine and lisinopril   - For anemia: Improved from prior admission at  [facility]. Monitor   - For hypothyroidism: Continue levothyroxine  - For FRAN: Improving. Monitor. Avoid nephrotoxins if possible and renally dose medications if appropriate  - For neuropathy: Continue gabapentin  - For abnormal chest x-ray: Mild right base atelectasis on CTA chest. Mobilize    Safe for telemetry transfer     F-PO  A-NA  S-NA  T-Lovenox   H-Head of bed greater than 30 degrees  U-NA  G-FSBS per unit protocol, correction dose insulin  S-NA  B-Will monitor daily and provide regimen if indicated  I-PIV  D-NA    Advance Directives:   Code Status and Medical Interventions:   Ordered at: 12/07/23 2030     Level Of Support Discussed With:    Patient     Code Status (Patient has no pulse and is not breathing):    CPR (Attempt to Resuscitate)     Medical Interventions (Patient has pulse or is breathing):    Full Support     High level of risk due to severe exacerbation of chronic illness and illness with threat to life or bodily function.    I conducted multidisciplinary rounds in the plan of care was discussed with the multidisciplinary team at that time. In attendance at multidisciplinary rounds was clinical pharmacist, dietitian, nursing staff and case management.    I discussed the patient's findings and my recommendations with patient, nursing staff, and consulting provider    -- Heladio Klein MD  Pulmonary/Critical Care

## 2023-12-12 ENCOUNTER — DOCUMENTATION (OUTPATIENT)
Dept: HOME HEALTH SERVICES | Facility: HOME HEALTHCARE | Age: 78
End: 2023-12-12
Payer: MEDICARE

## 2023-12-12 LAB
ALBUMIN SERPL-MCNC: 3.5 G/DL (ref 3.5–5.2)
ALBUMIN/GLOB SERPL: 1.8 G/DL
ALP SERPL-CCNC: 64 U/L (ref 39–117)
ALT SERPL W P-5'-P-CCNC: 11 U/L (ref 1–33)
ANION GAP SERPL CALCULATED.3IONS-SCNC: 7 MMOL/L (ref 5–15)
AST SERPL-CCNC: 13 U/L (ref 1–32)
BACTERIA SPEC AEROBE CULT: NORMAL
BACTERIA SPEC AEROBE CULT: NORMAL
BILIRUB SERPL-MCNC: 0.3 MG/DL (ref 0–1.2)
BUN SERPL-MCNC: 21 MG/DL (ref 8–23)
BUN/CREAT SERPL: 24.7 (ref 7–25)
CALCIUM SPEC-SCNC: 9.1 MG/DL (ref 8.6–10.5)
CHLORIDE SERPL-SCNC: 100 MMOL/L (ref 98–107)
CO2 SERPL-SCNC: 32 MMOL/L (ref 22–29)
CREAT SERPL-MCNC: 0.85 MG/DL (ref 0.57–1)
DEPRECATED RDW RBC AUTO: 45.2 FL (ref 37–54)
EGFRCR SERPLBLD CKD-EPI 2021: 70.2 ML/MIN/1.73
ERYTHROCYTE [DISTWIDTH] IN BLOOD BY AUTOMATED COUNT: 13.7 % (ref 12.3–15.4)
GLOBULIN UR ELPH-MCNC: 2 GM/DL
GLUCOSE BLDC GLUCOMTR-MCNC: 113 MG/DL (ref 70–130)
GLUCOSE BLDC GLUCOMTR-MCNC: 174 MG/DL (ref 70–130)
GLUCOSE BLDC GLUCOMTR-MCNC: 262 MG/DL (ref 70–130)
GLUCOSE BLDC GLUCOMTR-MCNC: 89 MG/DL (ref 70–130)
GLUCOSE SERPL-MCNC: 100 MG/DL (ref 65–99)
HCT VFR BLD AUTO: 29.6 % (ref 34–46.6)
HGB BLD-MCNC: 9 G/DL (ref 12–15.9)
MAGNESIUM SERPL-MCNC: 1.9 MG/DL (ref 1.6–2.4)
MCH RBC QN AUTO: 27.4 PG (ref 26.6–33)
MCHC RBC AUTO-ENTMCNC: 30.4 G/DL (ref 31.5–35.7)
MCV RBC AUTO: 90.2 FL (ref 79–97)
PLATELET # BLD AUTO: 256 10*3/MM3 (ref 140–450)
PMV BLD AUTO: 10.8 FL (ref 6–12)
POTASSIUM SERPL-SCNC: 4.2 MMOL/L (ref 3.5–5.2)
PROT SERPL-MCNC: 5.5 G/DL (ref 6–8.5)
RBC # BLD AUTO: 3.28 10*6/MM3 (ref 3.77–5.28)
SODIUM SERPL-SCNC: 139 MMOL/L (ref 136–145)
WBC NRBC COR # BLD AUTO: 7.22 10*3/MM3 (ref 3.4–10.8)

## 2023-12-12 PROCEDURE — 97530 THERAPEUTIC ACTIVITIES: CPT

## 2023-12-12 PROCEDURE — 25010000002 ENOXAPARIN PER 10 MG: Performed by: INTERNAL MEDICINE

## 2023-12-12 PROCEDURE — 83735 ASSAY OF MAGNESIUM: CPT | Performed by: INTERNAL MEDICINE

## 2023-12-12 PROCEDURE — 63710000001 DEXAMETHASONE PER 0.25 MG: Performed by: INTERNAL MEDICINE

## 2023-12-12 PROCEDURE — 99232 SBSQ HOSP IP/OBS MODERATE 35: CPT | Performed by: INTERNAL MEDICINE

## 2023-12-12 PROCEDURE — 97110 THERAPEUTIC EXERCISES: CPT

## 2023-12-12 PROCEDURE — 82948 REAGENT STRIP/BLOOD GLUCOSE: CPT

## 2023-12-12 PROCEDURE — 80053 COMPREHEN METABOLIC PANEL: CPT | Performed by: INTERNAL MEDICINE

## 2023-12-12 PROCEDURE — 85027 COMPLETE CBC AUTOMATED: CPT | Performed by: INTERNAL MEDICINE

## 2023-12-12 PROCEDURE — 63710000001 INSULIN REGULAR HUMAN PER 5 UNITS: Performed by: NURSE PRACTITIONER

## 2023-12-12 RX ADMIN — Medication 10 ML: at 09:32

## 2023-12-12 RX ADMIN — DEXAMETHASONE 6 MG: 4 TABLET ORAL at 09:32

## 2023-12-12 RX ADMIN — FUROSEMIDE 40 MG: 40 TABLET ORAL at 09:32

## 2023-12-12 RX ADMIN — GABAPENTIN 300 MG: 300 CAPSULE ORAL at 09:32

## 2023-12-12 RX ADMIN — INSULIN HUMAN 2 UNITS: 100 INJECTION, SOLUTION PARENTERAL at 17:18

## 2023-12-12 RX ADMIN — LEVOTHYROXINE SODIUM 50 MCG: 0.05 TABLET ORAL at 06:13

## 2023-12-12 RX ADMIN — Medication 10 ML: at 20:29

## 2023-12-12 RX ADMIN — SENNOSIDES AND DOCUSATE SODIUM 2 TABLET: 8.6; 5 TABLET ORAL at 09:32

## 2023-12-12 RX ADMIN — LISINOPRIL 40 MG: 40 TABLET ORAL at 09:33

## 2023-12-12 RX ADMIN — ENOXAPARIN SODIUM 40 MG: 100 INJECTION SUBCUTANEOUS at 20:27

## 2023-12-12 RX ADMIN — ENOXAPARIN SODIUM 40 MG: 100 INJECTION SUBCUTANEOUS at 09:31

## 2023-12-12 RX ADMIN — GABAPENTIN 300 MG: 300 CAPSULE ORAL at 20:27

## 2023-12-12 RX ADMIN — AMLODIPINE BESYLATE 10 MG: 10 TABLET ORAL at 09:32

## 2023-12-12 RX ADMIN — INSULIN HUMAN 4 UNITS: 100 INJECTION, SOLUTION PARENTERAL at 20:27

## 2023-12-12 NOTE — PLAN OF CARE
Goal Outcome Evaluation:  Plan of Care Reviewed With: patient        Progress: improving  Outcome Evaluation: Pt showing improvement as she increased ambulation distance to 80ft with RWx and CGA. Pulmonary exercise HEP administered and performed. Cont to progress as able; limited by decreased functional endurance, balance deficit, and generalized weakness compared to baseline. d/c rec for home with 24H assist, HH PT/OT, and RWx.      Anticipated Discharge Disposition (PT): home with home health, home with 24/7 care

## 2023-12-12 NOTE — PLAN OF CARE
Goal Outcome Evaluation:  Plan of Care Reviewed With: patient           Outcome Evaluation: Exam unchanged from previous. Well rested throughout the night with no complaints.

## 2023-12-12 NOTE — PROGRESS NOTES
"INTENSIVIST   PROGRESS NOTE     Hospital:  LOS: 5 days     Chief Complaint: Respiratory failure     Subjective   S     Interval History: No acute issues overnight.  On 1L nasal cannula with appropriate oxygen saturations.  Received last dose of remdesivir over the last 24 hours. Continues to improve.  She denies cough, hemoptysis, chest pain, syncope, presyncope, nausea, vomiting, diarrhea.    The patient's relevant past medical, surgical and social history were reviewed and updated in Epic as appropriate.      Objective   O     Intake/Ouptut 24 hrs (7:00AM - 6:59 AM)  Intake & Output (last 3 days)         12/09 0701  12/10 0700 12/10 0701 12/11 0700 12/11 0701 12/12 0700 12/12 0701 12/13 0700    P.O.      I.V. (mL/kg) 15.9 (0.2)  25 (0.2)     IV Piggyback 250  250     Total Intake(mL/kg) 1225.9 (11.8) 500 (4.8) 1475 (14.2)     Urine (mL/kg/hr) 650 (0.3) 1950 (0.8) 3450 (1.4)     Stool 0 0      Total Output 650 1950 3450     Net +575.9 -1450 -1975             Urine Unmeasured Occurrence 1 x 2 x      Stool Unmeasured Occurrence 2 x 2 x            Respiratory Support: Nasal cannula    Physical Examination:  Vital Signs: Blood pressure 145/62, pulse 72, temperature 98.6 °F (37 °C), temperature source Oral, resp. rate 16, height 165.1 cm (65\"), weight 104 kg (229 lb 11.5 oz), SpO2 95%.    General: The patient appears in no acute distress. Alert, cooperative and interactive.  Chest: Clear to auscultation bilaterally, No wheezing, rhonchi, or rales. Normal work of breathing. Equal chest rise.  Appropriate oxygen saturations on 1 L nasal cannula.  Cardiac: Regular rhythm, normal rate, S1S2 auscultated. No murmurs, rubs or gallops.   Abdomen: Soft, non-tender, non-distended, positive bowel sounds in all four quadrants.   Extremities: No lower extremity edema. No clubbing or cyanosis.  Skin: No rashes, open wounds, or bruising. Warm, dry, well-perfused.  Neuro: Motor power grossly intact bilaterally. Sensation " intact. Speech fluid and fluent. Thought process coherent.  Psych: Alert and oriented x3. Mood stable.    Lines, Drains & Airways       Active LDAs       Name Placement date Placement time Site Days    Peripheral IV 12/07/23 1457 Left Antecubital 12/07/23  1457  Antecubital  4    Peripheral IV 12/11/23 1015 Anterior;Distal;Left Wrist 12/11/23  1015  Wrist  less than 1    External Urinary Catheter 12/07/23 2030  --  3             Results from last 7 days   Lab Units 12/12/23  0408 12/11/23  0425 12/10/23  0323   WBC 10*3/mm3 7.22 4.89 5.44   HEMOGLOBIN g/dL 9.0* 8.4* 8.4*   MCV fL 90.2 93.1 94.0   PLATELETS 10*3/mm3 256 240 241     Results from last 7 days   Lab Units 12/12/23 0408 12/11/23  1534 12/11/23  0425 12/10/23  0323 12/09/23  0307 12/08/23  0355 12/07/23  1541   SODIUM mmol/L 139  --  143 143 145   < > 143   POTASSIUM mmol/L 4.2 5.0 3.6 3.7 3.8   < > 4.1   CO2 mmol/L 32.0*  --  36.0* 35.0* 36.0*   < > 34.0*   CREATININE mg/dL 0.85  --  0.66 0.81 0.85   < > 1.22*   GLUCOSE mg/dL 100*  --  85 104* 135*   < > 128*   MAGNESIUM mg/dL 1.9  --   --   --  1.8  --  1.7   PHOSPHORUS mg/dL  --   --   --   --  3.6  --   --     < > = values in this interval not displayed.     Estimated Creatinine Clearance: 65.3 mL/min (by C-G formula based on SCr of 0.85 mg/dL).  Results from last 7 days   Lab Units 12/12/23  0408 12/11/23  0425 12/10/23  0323   ALK PHOS U/L 64 59 61   BILIRUBIN mg/dL 0.3 0.2 0.2   ALT (SGPT) U/L 11 9 8   AST (SGOT) U/L 13 12 13     Results from last 7 days   Lab Units 12/09/23  1923 12/08/23  0719 12/08/23  0353 12/07/23  1756 12/07/23  1620   PH, ARTERIAL pH units 7.550* 7.325* 7.305* 7.358 7.322*   PCO2, ARTERIAL mm Hg 41.3 71.9* 75.5* 66.8* 73.0*   PO2 ART mm Hg 92.6 134.0* 118.0* 119.0* 42.4*   FIO2 % 28 45 50 40 28     Results: Reviewed.  - I reviewed the patient's new laboratory and imaging results  - Independently reviewed the patient's new images    Medications: Reviewed.    Assessment &  Plan    A / P     Active Hospital Problems    Diagnosis     **Acute hypercapnic respiratory failure     COVID-19 virus detected     Elevated brain natriuretic peptide (BNP) level     FRAN (acute kidney injury)     Sepsis     Type 2 diabetes mellitus     Anemia     Acute hypoxemic respiratory failure due to COVID-19      Patient Bro is a 78 y.o. male with history of HTN, T2DM, recent small intestinal ischemia presented to [facility] ED on 12/7/2023 with weakness, fever, and hypoxemia.  The patient lives at the [facility] and was found to have increased confusion, SOA weakness and fever.        Patient had combined hypoxemic and hypercapnic respiratory failure. She has required oxygen and has generally been maintained with a normal oxygen saturation on supplemental oxygen but does have a documented oxygen saturation of 87% on supplemental oxygen meeting the criteria for hypoxemic respiratory failure.  ABG initially had a pCO2 of 73 with a pH of 7.32 consistent with hypercapnic respiratory failure.     Patient was admitted to the ICU on BiPAP for respiratory failure. She has been treated with remdesivir and dexamethasone for COVID-pneumonia.     Chest x-ray shows elevated right hemidiaphragm.  CT chest showed some mild right base atelectasis but no evidence of endobronchial lesion.  There is no evidence of pulmonary embolism.  ABG on 12/9/2023 was within normal limits.     Certainly improving.  Now on 1 to 2 L nasal cannula.  Followed by PT/OT and cleared for home with assistance.    - For acute hypoxemic and hypercapnic respiratory failure secondary to COVID-pneumonia: Nasal cannula as needed.  CTA chest with just some mild right base atelectasis.  Repeat ABG on 12/9/2023 was essentially normal off of BiPAP greater than 48 hours.  Continue dexamethasone and remdesivir per protocol  - For hypertension: Slightly elevated.  Restarted home amlodipine and lisinopril   - For anemia: Improved from prior admission at  [facility]. Monitor   - For hypothyroidism: Continue levothyroxine  - For FRAN: Improving. Monitor. Avoid nephrotoxins if possible and renally dose medications if appropriate  - For neuropathy: Continue gabapentin  - For abnormal chest x-ray: Mild right base atelectasis on CTA chest. Mobilize as tolerated    Tentatively planning discharge tomorrow.  Needs home O2 set up.  Spoke with case management on 12/12.  Patient amendable to plan and has nephew for assistance at home this week and will have niece at her home for assistance next week.    F-PO  A-NA  S-NA  T-Lovenox   H-Head of bed greater than 30 degrees  U-NA  G-FSBS per unit protocol, correction dose insulin  S-NA  B-Will monitor daily and provide regimen if indicated  I-PIV  D-NA    Advance Directives:   Code Status and Medical Interventions:   Ordered at: 12/07/23 2030     Level Of Support Discussed With:    Patient     Code Status (Patient has no pulse and is not breathing):    CPR (Attempt to Resuscitate)     Medical Interventions (Patient has pulse or is breathing):    Full Support     High level of risk due to severe exacerbation of chronic illness and illness with threat to life or bodily function.    I conducted multidisciplinary rounds in the plan of care was discussed with the multidisciplinary team at that time. In attendance at multidisciplinary rounds was clinical pharmacist, dietitian, nursing staff and case management.    I discussed the patient's findings and my recommendations with patient, nursing staff, and consulting provider    -- Heladio Klein MD  Pulmonary/Critical Care

## 2023-12-12 NOTE — PROGRESS NOTES
Discussed home care with patient and she is agreeable to [facility]. PCP is Dr Cami Gonzalez and patient is current and message left regarding following for home care. Meir GODINEZ([facility] [employer])[facility] Liaison-Patient reports that we can call her cell phone at [phone] to schedule initial SOC-Meir GODINEZ([employer] [employer])[facility] Liaison

## 2023-12-12 NOTE — PLAN OF CARE
Goal Outcome Evaluation:  Plan of Care Reviewed With: patient        Progress: improving  Outcome Evaluation: pt up to the chair with OT. RA- 1L NC whole shift. No complaints of pain. ~2.5L UOP. ordered to Tele.

## 2023-12-12 NOTE — THERAPY TREATMENT NOTE
Patient Name: Mathew Crabtree  : 1945    MRN: [id number]                              Today's Date: 2023       Admit Date: 2023    Visit Dx:     ICD-10-CM ICD-9-CM   1. COVID-19  U07.1 079.89   2. Hypoxia  R09.02 799.02   3. Fatigue, unspecified type  R53.83 780.79   4. Hypercapnia  R06.89 786.09     Patient Active Problem List   Diagnosis    Acute hypercapnic respiratory failure    COVID-19 virus detected    Elevated brain natriuretic peptide (BNP) level    FRAN (acute kidney injury)    Sepsis    Type 2 diabetes mellitus    Anemia    Acute hypoxemic respiratory failure due to COVID-19     Past Medical History:   Diagnosis Date    Diabetes mellitus     Hypertension     Ischemia of small intestine      History reviewed. No pertinent surgical history.   General Information       Row Name 23 1358          Physical Therapy Time and Intention    Document Type therapy note (daily note)  -AY     Mode of Treatment physical therapy  -AY       Row Name 23 1358          General Information    Patient Profile Reviewed yes  -AY     Existing Precautions/Restrictions fall;oxygen therapy device and L/min  -AY     Barriers to Rehab medically complex;previous functional deficit  -AY       Row Name 23 1358          Cognition    Orientation Status (Cognition) oriented x 3  -AY       Row Name 23 1358          Safety Issues, Functional Mobility    Impairments Affecting Function (Mobility) balance;endurance/activity tolerance;shortness of breath;strength  -AY               User Key  (r) = Recorded By, (t) = Taken By, (c) = Cosigned By      Initials Name Provider Type    AY Rut Reaves PT Physical Therapist                   Mobility       Row Name 23 1358          Bed-Chair Transfer    Bed-Chair Remlap (Transfers) contact guard  -AY       Row Name 23 1358          Sit-Stand Transfer    Sit-Stand Remlap (Transfers) contact guard;verbal cues  -AY     Assistive Device  (Sit-Stand Transfers) walker, front-wheeled  -AY       Row Name 12/12/23 1358          Gait/Stairs (Locomotion)    Cameron Level (Gait) contact guard;verbal cues  -AY     Assistive Device (Gait) walker, front-wheeled  -AY     Distance in Feet (Gait) 80  -AY     Deviations/Abnormal Patterns (Gait) bilateral deviations;gait speed decreased;stride length decreased  -AY     Bilateral Gait Deviations forward flexed posture;heel strike decreased  -AY     Comment, (Gait/Stairs) pt demonstrated step through gait pattern with decreased zenia and flexed posture. cueing for posture, PLB, and increased stride length. overall gait distance limited by fatigue  -AY               User Key  (r) = Recorded By, (t) = Taken By, (c) = Cosigned By      Initials Name Provider Type    AY Rut Reaves PT Physical Therapist                   Obj/Interventions       Row Name 12/12/23 1401          Motor Skills    Motor Skills functional endurance  -AY     Functional Endurance pulmonary exercises handout administered and exercises performed. Pt educated on cont performance during the day  -AY       Row Name 12/12/23 1401          Shoulder (Therapeutic Exercise)    Shoulder (Therapeutic Exercise) AROM (active range of motion)  -AY     Shoulder AROM (Therapeutic Exercise) bilateral;flexion;extension;aBduction;aDduction;10 repetitions;sitting;scapular retraction  -AY       Row Name 12/12/23 1401          Balance    Balance Assessment sitting static balance;sitting dynamic balance;sit to stand dynamic balance;standing static balance;standing dynamic balance  -AY     Static Sitting Balance supervision  -AY     Dynamic Sitting Balance supervision  -AY     Position, Sitting Balance unsupported;sitting edge of bed  -AY     Sit to Stand Dynamic Balance contact guard  -AY     Static Standing Balance standby assist  -AY     Dynamic Standing Balance contact guard  -AY     Position/Device Used, Standing Balance supported;walker, rolling  -AY                User Key  (r) = Recorded By, (t) = Taken By, (c) = Cosigned By      Initials Name Provider Type    AY Rut Reaves, PT Physical Therapist                   Goals/Plan    No documentation.                  Clinical Impression       Row Name 12/12/23 1402          Pain    Pretreatment Pain Rating 0/10 - no pain  -AY     Posttreatment Pain Rating 0/10 - no pain  -AY     Pain Intervention(s) Ambulation/increased activity;Repositioned  -AY     Additional Documentation Pain Scale: Numbers Pre/Post-Treatment (Group)  -AY       Row Name 12/12/23 1402          Plan of Care Review    Plan of Care Reviewed With patient  -AY     Progress improving  -AY     Outcome Evaluation Pt showing improvement as she increased ambulation distance to 80ft with RWx and CGA. Pulmonary exercise HEP administered and performed. Cont to progress as able; limited by decreased functional endurance, balance deficit, and generalized weakness compared to baseline. d/c rec for home with 24H assist, HH PT/OT, and RWx.  -AY       Row Name 12/12/23 1402          Vital Signs    Pre Systolic BP Rehab 133  -AY     Pre Treatment Diastolic BP 67  -AY     Post Systolic BP Rehab 141  -AY     Post Treatment Diastolic   -AY     Pretreatment Heart Rate (beats/min) 65  -AY     Posttreatment Heart Rate (beats/min) 82  -AY     Pre SpO2 (%) 96  -AY     O2 Delivery Pre Treatment room air  -AY     Intra SpO2 (%) 92  -AY     O2 Delivery Intra Treatment room air  -AY     Post SpO2 (%) 99  -AY     O2 Delivery Post Treatment room air  -AY     Pre Patient Position Sitting  -AY     Intra Patient Position Standing  -AY     Post Patient Position Sitting  -AY       Row Name 12/12/23 140          Positioning and Restraints    Pre-Treatment Position sitting in chair/recliner  -AY     Post Treatment Position chair  -AY     In Chair notified nsg;reclined;call light within reach;sitting;encouraged to call for assist;legs elevated;waffle cushion  no alarm box. RN aware.   -AY               User Key  (r) = Recorded By, (t) = Taken By, (c) = Cosigned By      Initials Name Provider Type    Rut Young PT Physical Therapist                   Outcome Measures       Row Name 12/12/23 1405          How much help from another person do you currently need...    Turning from your back to your side while in flat bed without using bedrails? 4  -AY     Moving from lying on back to sitting on the side of a flat bed without bedrails? 3  -AY     Moving to and from a bed to a chair (including a wheelchair)? 3  -AY     Standing up from a chair using your arms (e.g., wheelchair, bedside chair)? 3  -AY     Climbing 3-5 steps with a railing? 3  -AY     To walk in hospital room? 3  -AY     AM-PAC 6 Clicks Score (PT) 19  -AY     Highest Level of Mobility Goal 6 --> Walk 10 steps or more  -AY       Row Name 12/12/23 1405          Functional Assessment    Outcome Measure Options AM-PAC 6 Clicks Basic Mobility (PT)  -AY               User Key  (r) = Recorded By, (t) = Taken By, (c) = Cosigned By      Initials Name Provider Type    Rut Young PT Physical Therapist                                 Physical Therapy Education       Title: PT OT SLP Therapies (In Progress)       Topic: Physical Therapy (In Progress)       Point: Mobility training (In Progress)       Learning Progress Summary             Patient Acceptance, E,TB, NR by AY at 12/12/2023 1405    Acceptance, E,TB, VU by ES at 12/10/2023 0958                         Point: Home exercise program (In Progress)       Learning Progress Summary             Patient Acceptance, E,TB, NR by AY at 12/12/2023 1405                         Point: Body mechanics (In Progress)       Learning Progress Summary             Patient Acceptance, E,TB, NR by AY at 12/12/2023 1405    Acceptance, E,TB, VU by ES at 12/10/2023 0958                         Point: Precautions (In Progress)       Learning Progress Summary             Patient Acceptance, E,TB, NR by AY  at 12/12/2023 1405    Acceptance, E,TB, VU by ES at 12/10/2023 0958                                         User Key       Initials Effective Dates Name Provider Type Discipline    AY 11/10/20 -  Rtu Reaves, PT Physical Therapist PT    ES 08/11/22 -  Kristina Adam, CECILLE Physical Therapist PT                  PT Recommendation and Plan     Plan of Care Reviewed With: patient  Progress: improving  Outcome Evaluation: Pt showing improvement as she increased ambulation distance to 80ft with RWx and CGA. Pulmonary exercise HEP administered and performed. Cont to progress as able; limited by decreased functional endurance, balance deficit, and generalized weakness compared to baseline. d/c rec for home with 24H assist, HH PT/OT, and RWx.     Time Calculation:         PT Charges       Row Name 12/12/23 1405             Time Calculation    Start Time 1005  -AY      PT Received On 12/12/23  -AY         Timed Charges    14134 - PT Therapeutic Exercise Minutes 10  -AY      54081 - PT Therapeutic Activity Minutes 13  -AY         Total Minutes    Timed Charges Total Minutes 23  -AY       Total Minutes 23  -AY                User Key  (r) = Recorded By, (t) = Taken By, (c) = Cosigned By      Initials Name Provider Type    AY uRt Reaves, CECILLE Physical Therapist                  Therapy Charges for Today       Code Description Service Date Service Provider Modifiers Qty    53759801873 HC PT THERAPEUTIC ACT EA 15 MIN 12/12/2023 Rut Reaves, PT GP 1    67068649910 HC PT THER PROC EA 15 MIN 12/12/2023 Rut Reaves, PT GP 1            PT G-Codes  Outcome Measure Options: AM-PAC 6 Clicks Basic Mobility (PT)  AM-PAC 6 Clicks Score (PT): 19  AM-PAC 6 Clicks Score (OT): 18  PT Discharge Summary  Anticipated Discharge Disposition (PT): home with home health, home with 24/7 care    Rut Reaves PT  12/12/2023

## 2023-12-13 ENCOUNTER — HOME HEALTH ADMISSION (OUTPATIENT)
Dept: HOME HEALTH SERVICES | Facility: HOME HEALTHCARE | Age: 78
End: 2023-12-13
Payer: MEDICARE

## 2023-12-13 ENCOUNTER — READMISSION MANAGEMENT (OUTPATIENT)
Dept: CALL CENTER | Facility: HOSPITAL | Age: 78
End: 2023-12-13
Payer: MEDICARE

## 2023-12-13 VITALS
DIASTOLIC BLOOD PRESSURE: 62 MMHG | TEMPERATURE: 98.1 F | WEIGHT: 229.72 LBS | BODY MASS INDEX: 38.27 KG/M2 | HEART RATE: 89 BPM | RESPIRATION RATE: 16 BRPM | SYSTOLIC BLOOD PRESSURE: 130 MMHG | OXYGEN SATURATION: 95 % | HEIGHT: 65 IN

## 2023-12-13 PROBLEM — J96.02 ACUTE HYPERCAPNIC RESPIRATORY FAILURE: Status: RESOLVED | Noted: 2023-12-07 | Resolved: 2023-12-13

## 2023-12-13 LAB
ALBUMIN SERPL-MCNC: 3.4 G/DL (ref 3.5–5.2)
ALBUMIN/GLOB SERPL: 1.7 G/DL
ALP SERPL-CCNC: 63 U/L (ref 39–117)
ALT SERPL W P-5'-P-CCNC: 12 U/L (ref 1–33)
ANION GAP SERPL CALCULATED.3IONS-SCNC: 8 MMOL/L (ref 5–15)
AST SERPL-CCNC: 13 U/L (ref 1–32)
BILIRUB SERPL-MCNC: 0.2 MG/DL (ref 0–1.2)
BUN SERPL-MCNC: 23 MG/DL (ref 8–23)
BUN/CREAT SERPL: 27.1 (ref 7–25)
CALCIUM SPEC-SCNC: 9.1 MG/DL (ref 8.6–10.5)
CHLORIDE SERPL-SCNC: 100 MMOL/L (ref 98–107)
CO2 SERPL-SCNC: 31 MMOL/L (ref 22–29)
CREAT SERPL-MCNC: 0.85 MG/DL (ref 0.57–1)
DEPRECATED RDW RBC AUTO: 45.1 FL (ref 37–54)
EGFRCR SERPLBLD CKD-EPI 2021: 70.2 ML/MIN/1.73
ERYTHROCYTE [DISTWIDTH] IN BLOOD BY AUTOMATED COUNT: 13.5 % (ref 12.3–15.4)
GLOBULIN UR ELPH-MCNC: 2 GM/DL
GLUCOSE BLDC GLUCOMTR-MCNC: 121 MG/DL (ref 70–130)
GLUCOSE BLDC GLUCOMTR-MCNC: 87 MG/DL (ref 70–130)
GLUCOSE SERPL-MCNC: 96 MG/DL (ref 65–99)
HCT VFR BLD AUTO: 30 % (ref 34–46.6)
HGB BLD-MCNC: 9.2 G/DL (ref 12–15.9)
MAGNESIUM SERPL-MCNC: 1.9 MG/DL (ref 1.6–2.4)
MCH RBC QN AUTO: 27.9 PG (ref 26.6–33)
MCHC RBC AUTO-ENTMCNC: 30.7 G/DL (ref 31.5–35.7)
MCV RBC AUTO: 90.9 FL (ref 79–97)
PLATELET # BLD AUTO: 269 10*3/MM3 (ref 140–450)
PMV BLD AUTO: 11 FL (ref 6–12)
POTASSIUM SERPL-SCNC: 4.3 MMOL/L (ref 3.5–5.2)
PROT SERPL-MCNC: 5.4 G/DL (ref 6–8.5)
RBC # BLD AUTO: 3.3 10*6/MM3 (ref 3.77–5.28)
SODIUM SERPL-SCNC: 139 MMOL/L (ref 136–145)
WBC NRBC COR # BLD AUTO: 9.87 10*3/MM3 (ref 3.4–10.8)

## 2023-12-13 PROCEDURE — 83735 ASSAY OF MAGNESIUM: CPT | Performed by: INTERNAL MEDICINE

## 2023-12-13 PROCEDURE — 82948 REAGENT STRIP/BLOOD GLUCOSE: CPT

## 2023-12-13 PROCEDURE — 25010000002 ENOXAPARIN PER 10 MG: Performed by: INTERNAL MEDICINE

## 2023-12-13 PROCEDURE — 99239 HOSP IP/OBS DSCHRG MGMT >30: CPT | Performed by: NURSE PRACTITIONER

## 2023-12-13 PROCEDURE — 80053 COMPREHEN METABOLIC PANEL: CPT | Performed by: INTERNAL MEDICINE

## 2023-12-13 PROCEDURE — 63710000001 DEXAMETHASONE PER 0.25 MG: Performed by: INTERNAL MEDICINE

## 2023-12-13 PROCEDURE — 85027 COMPLETE CBC AUTOMATED: CPT | Performed by: INTERNAL MEDICINE

## 2023-12-13 RX ORDER — DEXAMETHASONE 6 MG/1
6 TABLET ORAL DAILY
Qty: 3 TABLET | Refills: 0 | Status: SHIPPED | OUTPATIENT
Start: 2023-12-14 | End: 2023-12-17

## 2023-12-13 RX ADMIN — FUROSEMIDE 40 MG: 40 TABLET ORAL at 08:31

## 2023-12-13 RX ADMIN — LEVOTHYROXINE SODIUM 50 MCG: 0.05 TABLET ORAL at 06:22

## 2023-12-13 RX ADMIN — ENOXAPARIN SODIUM 40 MG: 100 INJECTION SUBCUTANEOUS at 08:31

## 2023-12-13 RX ADMIN — DEXAMETHASONE 6 MG: 4 TABLET ORAL at 08:31

## 2023-12-13 RX ADMIN — AMLODIPINE BESYLATE 10 MG: 10 TABLET ORAL at 08:31

## 2023-12-13 RX ADMIN — LISINOPRIL 40 MG: 40 TABLET ORAL at 08:31

## 2023-12-13 RX ADMIN — Medication 10 ML: at 08:31

## 2023-12-13 RX ADMIN — GABAPENTIN 300 MG: 300 CAPSULE ORAL at 08:31

## 2023-12-13 NOTE — PROGRESS NOTES
Spoke with Dr Gonzalez office.  She will follow for home health. Rehana VIEIRA-[facility]-Liaison.

## 2023-12-13 NOTE — PLAN OF CARE
Goal Outcome Evaluation:  Plan of Care Reviewed With: patient        Progress: improving  Outcome Evaluation: pt d/c home with DME and home O2. AOx4. Roommate providing transportation home. Education reviewed, pt verbalized understanding.

## 2023-12-13 NOTE — DISCHARGE SUMMARY
DISCHARGE SUMMARY     Admit date: 12/7/2023  Date of Discharge:  12/13/2023    Discharge Diagnoses  Active Hospital Problems    Diagnosis  POA   • COVID-19 virus detected [U07.1]  Yes   • Elevated brain natriuretic peptide (BNP) level [R79.89]  Yes   • FRAN (acute kidney injury) [N17.9]  Yes   • Sepsis [A41.9]  Yes   • Type 2 diabetes mellitus [E11.9]  Yes   • Anemia [D64.9]  Yes   • Acute hypoxemic respiratory failure due to COVID-19 [U07.1, J96.01]  Yes      Resolved Hospital Problems    Diagnosis Date Resolved POA   • **Acute hypercapnic respiratory failure [J96.02] 12/13/2023 Yes       History reviewed. No pertinent surgical history.    History of Present Illness    Patient is a 78 y.o. female PMH Diabetes, HTN and ischemia of the small intestines who presents to the [facility] ED 12/7/23 for weakness, fever and low oxygenation. Patient lives at the [facility] where 12/7, she was found to be more confused and weaker than baseline. At 830 on the morning of admission she had a temperature of 100.6 and then around 1230 fever was greater than 102. She was given 1g of Tylenol. ON evaluation she was found to be hypoxic. With worsening fatigue, hypoxia and fever the decision to bring her to the hospital was made. EMS was called and she was brought to the ED for evaluation.     VS on arrival: temp 97.9, HR 63, RR 22, /51, SpO2 89%. Significant labs: BNP 3719, ABG 7.32 / 73 / 42.4 / 37.8 (on NC), glucose 128, BUN 15, Creat 1.22, Na 143, K 4.1, AST 18, ALT 7, Alk phos 84, bili 0.3, lactate 2.3, Wbc 6.34, Hgb 8.5 Plts 211, COVID 19 Positive, mag 1.7, CRP 4.79, PCT 0.23, troponin 82.      While in the ED she was put on Biap. Repeat ABG 7.35 / 66.8 / 119 / 37.5. She was given Remdesivir, Lasix and Dexamethasone. With her respiratory failure she is being admitted to the ICU for management.      Of note patient was admitted to [facility] from 10/21-11/16. She presented with diffuse abdominal pain, diarrhea with mucous,  nausea and vomiting over a week with decreased PO intake. Workup revealed a leukocytosis, FRAN, lactic acidosis. CT a/p showed extensive portal venous gas out of proportion to pneumatosis concerning for ischemic or necrotic bowel. Abnormally dilated loops of small bowel with bacterial overgrowth and associated swirling of the mesentery concerning for internal hernia. She was emergently taken to the OR where she underwent ex-lap with lysis of adhesions. She had no necrotic bowel needing resection. Post op she developed ATN/FRAN and ultimately required iHD. Last session was on 11/7 and HD catheter was removed on 11/13. She also had a SBO requiring TPN. On 11/4 she had a worsening sepsis requiring broadening of her antibiotics. CT scan of her A/P showed 5.5 fluid collection thought to not likely represent an abscess. She was also found to have a pneumonia and worsening respiratory status requiring reintubation on 11/6. She was extubated on 11/9 and her respiratory status remained stable on room air. Antibiotics were stoppe don 11/13 and she remained stable without leukocytosis or fever. She was evaluated by PT/OT who found her to be a good candidate for continued hterapy for weakness. She was discharged to the [place].     Hospital Course    Admitted for reasons stated above in HPI. Was diagnosed with combined hypoxemic and hypercapnic respiratory failure. Did require oxygen and has generally been maintained with a normal oxygen saturation on supplemental oxygen (down to 1-2L NC on day of discharge). For her covid PNA, She was treated and completed remdesivir and has been receiving dexamethasone which will complete on 12/16/23. CXR showed right elevated hemidiaphragm. CT chest showed some mild right base atelectasis but no evidecne of endobronchiali lesion. No evidence of PE. ABG's improving. Was followed by PT/OT throughout hospitalization. They felt she would need home with assistance and home  health.    Hospitalization complicated by HTN for which her home amlodipine and lisinopril were restarted. Home metoprolol has been held through hospitalization. ON day of discharge HR is running in the 60's will continue to hold until seen by PCP. She also had some anemia that improved. Her FRAN has improved with avoidance of nephrotoxins and renally dosed medications.     On 12/13/23 she has met maximum benefit from hospitalization and is ready for discharge. She will be discharged home with home health PT/OT and with 24 hour assist.    Follow up with PCP in 1 week to determine O2 needs and Metoprolol reinitiating     Physical Exam:  Constitutional:  Resting in bed. Appears well-developed and well-nourished. No distress.   HEENT:  Normocephalic and atraumatic. PERRL  Neck: Normal range of motion. Neck supple. No JVD present.   Cardiovascular: Normal rate, regular rhythm, normal heart sounds and intact distal pulses.  No gallop and no friction rub.  No murmur heard.  Pulmonary/Chest: Effort normal and breath sounds normal. No respiratory distress. No wheezes, rhonchi or rales.   Abdominal: Soft. No distension and no mass. There is no tenderness.   Musculoskeletal: Normal range of motion.   Neurological: Alert and oriented to person, place, and time.  No focal deficits  Skin: Skin is warm and dry. No rash noted.   Extremities:  No clubbing, edema or cyanosis  Psychiatric: Normal mood and affect. Behavior is normal.       Procedures Performed:    None    Consults:   None    Pertinent Test Results:   Results from last 7 days   Lab Units 12/13/23  0426   WBC 10*3/mm3 9.87   HEMOGLOBIN g/dL 9.2*   HEMATOCRIT % 30.0*   PLATELETS 10*3/mm3 269     Results from last 7 days   Lab Units 12/13/23  0426 12/12/23  0408 12/11/23  1534 12/11/23  0425 12/10/23  0323 12/09/23  0307   SODIUM mmol/L 139 139  --  143   < > 145   POTASSIUM mmol/L 4.3 4.2 5.0 3.6   < > 3.8   CHLORIDE mmol/L 100 100  --  101   < > 102   CO2 mmol/L 31.0*  32.0*  --  36.0*   < > 36.0*   BUN mg/dL 23 21  --  19   < > 23   CREATININE mg/dL 0.85 0.85  --  0.66   < > 0.85   EGFR mL/min/1.73 70.2 70.2  --  89.9   < > 70.2   GLUCOSE mg/dL 96 100*  --  85   < > 135*   CALCIUM mg/dL 9.1 9.1  --  8.8   < > 8.7   PHOSPHORUS mg/dL  --   --   --   --   --  3.6    < > = values in this interval not displayed.     Results from last 7 days   Lab Units 12/08/23  0355   HEMOGLOBIN A1C % 5.50       Condition on Discharge:  Stable    Discharge Disposition: Home or Self CareHome    Discharge Medications:      Discharge Medications        New Medications        Instructions Start Date   dexAMETHasone 6 MG tablet  Commonly known as: DECADRON   6 mg, Oral, Daily   Start Date: December 14, 2023            Continue These Medications        Instructions Start Date   acetaminophen 325 MG tablet  Commonly known as: TYLENOL   650 mg, Oral, Every 6 Hours PRN      amLODIPine 10 MG tablet  Commonly known as: NORVASC   10 mg, Oral, Daily      atorvastatin 20 MG tablet  Commonly known as: LIPITOR   20 mg, Oral, Daily      docusate sodium 100 MG capsule  Commonly known as: COLACE   100 mg, Oral, 2 Times Daily PRN      furosemide 40 MG tablet  Commonly known as: LASIX   40 mg, Oral, Daily      gabapentin 300 MG capsule  Commonly known as: NEURONTIN   300 mg, Oral, 2 Times Daily      levothyroxine 50 MCG tablet  Commonly known as: SYNTHROID, LEVOTHROID   50 mcg, Oral, Daily      lisinopril 40 MG tablet  Commonly known as: PRINIVIL,ZESTRIL   40 mg, Oral, Daily      melatonin 3 MG tablet   3 mg, Nightly PRN      metFORMIN 500 MG tablet  Commonly known as: GLUCOPHAGE   500 mg, Oral, Daily With Breakfast      MiraLax 17 GM/SCOOP powder  Generic drug: polyethylene glycol   17 g, Oral, Daily PRN      nystatin 253852 UNIT/GM powder  Commonly known as: MYCOSTATIN   Apply small amount topically twice a day to abd folds and under Bilat breast for prevention.             Stop These Medications      metoprolol  tartrate 25 MG tablet  Commonly known as: LOPRESSOR              Discharge Diet: Diet: Cardiac Diets, Diabetic Diets; Healthy Heart (2-3 Na+); Consistent Carbohydrate; Texture: Regular Texture (IDDSI 7); Fluid Consistency: Thin (IDDSI 0)    Activity at Discharge: As tolerated; Home health PT/OT arranged by ;  home with 24 hour assist    Follow-up Appointments  No future appointments.  Additional Instructions for the Follow-ups that You Need to Schedule       Ambulatory Referral to Home Health   As directed      Face to Face Visit Date: 12/12/2023   Follow-up provider for Plan of Care?: I will be treating the patient on an ongoing basis.  Please send me the Plan of Care for signature.   Follow-up provider: CAMI GARCIA [[id number]]   Reason/Clinical Findings: acute hypercapnic respiratory failure   Describe mobility limitations that make leaving home difficult: impaired physical mobility and gait endurance   Nursing/Therapeutic Services Requested: Skilled Nursing Physical Therapy Occupational Therapy   Skilled nursing orders: Medication education O2 instruction   PT orders: Strengthening   Occupational orders: Energy conservation Home safety assessment   Frequency: 1 Week 1        Discharge Follow-up with PCP   As directed       Currently Documented PCP:    Cami Garcia MD    PCP Phone Number:    [phone]     Follow Up Details: 1 week for O2 and BB needs                Test Results Pending at Discharge       Code Status and Medical Interventions:   Ordered at: 12/07/23 2030     Level Of Support Discussed With:    Patient     Code Status (Patient has no pulse and is not breathing):    CPR (Attempt to Resuscitate)     Medical Interventions (Patient has pulse or is breathing):    Full Support       LISA Martinez  12/13/23  11:02 EST    Discharge Time Spent:     I spent  35  minutes on this discharge activity which included: face-to-face encounter with the patient, reviewing the data in  the system, coordination of the care with the nursing staff as well as consultants, documentation, and entering orders.        Electronically signed by LISA Martinez, 12/13/23, 8:49 AM EST.

## 2023-12-13 NOTE — CASE MANAGEMENT/SOCIAL WORK
Case Management Discharge Note      Final Note: Ms. Crabtree is being discharged home today. [facility] will follow her at home with skilled nursing, physical, and occupational therapies. [facility] will deliver her oxygen to her bedside prior to discharge. She will be transported home by family via private vehicle. No additional discharge needs identified.         Selected Continued Care - Admitted Since 12/7/2023       Destination    No services have been selected for the patient.                Durable Medical Equipment Coordination complete.      Service Provider Selected Services Address Phone Fax Patient Preferred    [facility] [address] [place] [address] [id number] [phone] --              Dialysis/Infusion    No services have been selected for the patient.                Home Medical Care Coordination complete.      Service Provider Selected Services Address Phone Fax Patient Preferred    [facility] [address], [facility] [address] [phone] --              Therapy    No services have been selected for the patient.                Community Resources    No services have been selected for the patient.                Community & DME    No services have been selected for the patient.                    Transportation Services  Private: Car    Final Discharge Disposition Code: 06 - home with home health care

## 2023-12-14 ENCOUNTER — HOME CARE VISIT (OUTPATIENT)
Dept: HOME HEALTH SERVICES | Facility: HOME HEALTHCARE | Age: 78
End: 2023-12-14
Payer: MEDICARE

## 2023-12-14 VITALS
DIASTOLIC BLOOD PRESSURE: 64 MMHG | TEMPERATURE: 99 F | RESPIRATION RATE: 18 BRPM | OXYGEN SATURATION: 100 % | SYSTOLIC BLOOD PRESSURE: 124 MMHG | HEART RATE: 64 BPM

## 2023-12-14 PROCEDURE — G0299 HHS/HOSPICE OF RN EA 15 MIN: HCPCS

## 2023-12-14 NOTE — Clinical Note
"SOC Note: Hospitalized 12. 7 - 12/13 at [facility] [place], operated by [facility] with COVID.  Was transferred from the [place] where she had resided 11/16 through 12/7.     Home Health ordered for: disciplines SN, PT, OT     Reason for Hosp/Primary Dx/Co-morbidities: COVID   Hx of Diabetes, Hypothyroidism, HTN,   Prior to this hospitalization she was at [facility] 10/21 [facility] 11/16 due to concerns about SBO/necrosis. Underwent surgery for lysis of adhesions.  Postop she develped FRAN and required hemodialysis inpatient,  had a SBO without need for surgery, Pneumonia and resp failure which required ICU and intubation.     Focus of Care: Respiratory assessments, new oxgyen education due to dx of COVID.  Safety education, and medicaiton eduction.  Current health to be set up and instructed on.     Patient's goal(s):\" To get well and stay out of the hospital\"    Current Functional status/mobility/DME: Very weak and requries use of walker and or cane.  House is very cluttered and there are fall concners, lyndon with introduction of new oxgyen tubing.     HB status/Living Arrangements: Requires moderate assist of 1 to 2 persons , use of walker and freuqent rest periods in order to leave home due to dyspnea with mild exertion, leg pain, and weakness.     Skin Integrity/wound status: skin intact with no open areas noted.  Prem score of 18    Code Status: Full Code    Fall Risk/Safety concerns: Mahc score of 6    Educated on Emergency Plan, steps to take prior to going to the ER and when to Call Home Health [facility]:  Yes,  magnet with office number put on fridge along with homecare calendar  Oxygen safety educated on and contacted DME due to absence of medical alert of oxgyen in the home.     Medication issues/Concerns:  Contacted PCP regarding lasix.  Called Dr Cami Naidu and spoke with nurse in office.  Pt sent home with Laix 40mg on hospital discahrge list but was not given a script and has no lasix in the home.  Pt was taking HTCZ 25mg daily prior " to hospitalization but it is not on the med list    Additional Problems/Concerns: COVID dx 12/ 7/23   finishes dexamethasone on 12/ 16/23    SDOH Barriers (i.e. caregiver concerns, social isolation, transportation, food insecurity, environment, income etc.)/Need for MSW: offered MSW for assist with LW and HCPA  pt declined    Plan for next visit: Respiratory assessment,  set up current health and instruct pt,  followup on lasix issue with Dr Gonzalez.

## 2023-12-14 NOTE — OUTREACH NOTE
Prep Survey      Flowsheet Row Responses   [facility] facility patient discharged from? [place]   Is LACE score < 7 ? No   Eligibility Readm Mgmt   Discharge diagnosis COVID-19 virus detected, Sepsis   Does the patient have one of the following disease processes/diagnoses(primary or secondary)? Sepsis   Does the patient have Home health ordered? Yes   What is the Home health agency?  [facility]   Is there a DME ordered? Yes   What DME was ordered? [facility]- 02   Prep survey completed? Yes            Mireya BRADY - Registered Nurse

## 2023-12-14 NOTE — HOME HEALTH
"SOC Note: Hospitalized 12. 7 - 12/13 at [facility] with COVID.  Was transferred from the [place] where she had resided 11/16 through 12/7.     Home Health ordered for: disciplines SN, PT, OT     Reason for Hosp/Primary Dx/Co-morbidities: COVID   Hx of Diabetes, Hypothyroidism, HTN,   Prior to this hospitalization she was at [facility] 10[facility]21 [facility] 11/16 due to concerns about SBO/necrosis. Underwent surgery for lysis of adhesions.  Postop she develped FRAN and required hemodialysis inpatient,  had a SBO without need for surgery, Pneumonia and resp failure which required ICU and intubation.     Focus of Care: Respiratory assessments, new oxgyen education due to dx of COVID.  Safety education, and medicaiton eduction.  Current health to be set up and instructed on.     Patient's goal(s):\" To get well and stay out of the hospital\"    Current Functional status/mobility/DME: Very weak and requries use of walker and or cane.  House is very cluttered and there are fall concners, lyndon with introduction of new oxgyen tubing.     HB status/Living Arrangements: Requires moderate assist of 1 to 2 persons , use of walker and freuqent rest periods in order to leave home due to dyspnea with mild exertion, leg pain, and weakness.     Skin Integrity/wound status: skin intact with no open areas noted.  Prem score of 18    Code Status: Full Code    Fall Risk/Safety concerns: Mahc score of 6    Educated on Emergency Plan, steps to take prior to going to the ER and when to Call [facility] Health [facility]:  Yes,  magnet with office number put on fridge along with homecare calendar  Oxygen safety educated on and contacted DME due to absence of medical alert of oxgyen in the home.     Medication issues/Concerns:  Contacted PCP regarding lasix.  Called Dr Cami Naidu and spoke with nurse in office.  Pt sent home with Laix 40mg on hospital discahrge list but was not given a script and has no lasix in the home.  Pt was taking HTCZ 25mg daily prior " to hospitalization but it is not on the med list    Additional Problems/Concerns: COVID dx 12/ 7/23   finishes dexamethasone on 12/ 16/23    SDOH Barriers (i.e. caregiver concerns, social isolation, transportation, food insecurity, environment, income etc.)/Need for MSW: offered MSW for assist with LW and HCPA  pt declined    Plan for next visit: Respiratory assessment,  set up current health and instruct pt,  followup on lasix issue with Dr Gonzalez.

## 2023-12-15 ENCOUNTER — HOME CARE VISIT (OUTPATIENT)
Dept: HOME HEALTH SERVICES | Facility: HOME HEALTHCARE | Age: 78
End: 2023-12-15
Payer: MEDICARE

## 2023-12-15 LAB
QT INTERVAL: 444 MS
QTC INTERVAL: 450 MS

## 2023-12-15 PROCEDURE — G0299 HHS/HOSPICE OF RN EA 15 MIN: HCPCS

## 2023-12-15 NOTE — HH RPM NOTES
Routine Visit Note: LPN    Skill/education provided: cp assessment d/t covid 19 . no falls reported, reviewed medications no changes made . educated pt on high risk medications and when to call HH/PCP/911. v/s wdl. instructed pt to obtain a paperlog of b/p , 02 , HR and weight daily. pt verbalized understanding and states she will.    Patient/caregiver response: pt will cont using walker of javon ambulation, pt will continue medications as prescribed pt will call HH/ PCP/911 with adverse reations to medications.    Plan for next visit: c/p assessment

## 2023-12-15 NOTE — HH RPM NOTES
current health attempted set up in home, pt does not have safe wiringing to set device up in home. pt is not a candiate, device cables are not long enough to supply devise with internet service

## 2023-12-18 ENCOUNTER — READMISSION MANAGEMENT (OUTPATIENT)
Dept: CALL CENTER | Facility: HOSPITAL | Age: 78
End: 2023-12-18
Payer: MEDICARE

## 2023-12-18 ENCOUNTER — HOME CARE VISIT (OUTPATIENT)
Dept: HOME HEALTH SERVICES | Facility: HOME HEALTHCARE | Age: 78
End: 2023-12-18
Payer: MEDICARE

## 2023-12-18 VITALS
OXYGEN SATURATION: 97 % | SYSTOLIC BLOOD PRESSURE: 140 MMHG | HEART RATE: 99 BPM | TEMPERATURE: 97.3 F | RESPIRATION RATE: 18 BRPM | DIASTOLIC BLOOD PRESSURE: 80 MMHG

## 2023-12-18 PROCEDURE — G0299 HHS/HOSPICE OF RN EA 15 MIN: HCPCS

## 2023-12-18 NOTE — OUTREACH NOTE
Sepsis Week 1 Survey      Flowsheet Row Responses   [facility] patient discharged from? [place]   Does the patient have one of the following disease processes/diagnoses(primary or secondary)? Sepsis   Week 1 attempt successful? Yes   Call start time [id number]   Call end time [id number]   Discharge diagnosis COVID-19 virus detected, Sepsis   Meds reviewed with patient/caregiver? Yes   Is the patient having any side effects they believe may be caused by any medication additions or changes? No   Does the patient have all medications related to this admission filled (includes all antibiotics, inhalers, nebulizers,steroids,etc.) Yes   Is the patient taking all medications as directed (includes completed medication regime)? Yes   Does the patient have a primary care provider?  Yes   What is the Home health agency?  [facility]   Has home health visited the patient within 72 hours of discharge? Yes   What DME was ordered? [facility]- 02   Has all DME been delivered? Yes   DME comments Using 2L with sats at 91%.  Bp is at 126/86 today   Psychosocial issues? No   Did the patient receive a copy of their discharge instructions? Yes   Nursing interventions Reviewed instructions with patient   What is the patient's perception of their health status since discharge? Improving   Nursing interventions Nurse provided patient education   Is the patient/caregiver able to teach back TIME? T emperature - higher or lower than normal, I nfection - may have signs and symptoms of an infection, M ental Decline - confused, sleepy, difficult to arouse, E xtremely Ill - severe pain, discomfort, shortness of breath   Nursing interventions Nurse provided patient education   Is patient/caregiver able to teach back steps to recovery at home? Rest and regain strength, Eat a balanced diet   Is the patient/caregiver able to teach back signs and symptoms of worsening condition: Fever, Hyperthermia   If the patient is a current smoker, are they  able to teach back resources for cessation? Not a smoker   Is the patient/caregiver able to teach back the hierarchy of who to call/visit for symptoms/problems? PCP, Specialist, Home health nurse, Urgent Care, ED, 911 Yes   Additional teach back comments States she is doing well but is a little weak still. Home health has been coming.   Week 1 call completed? Yes   Wrap up additional comments Denies questions or needs at this time   Call end time [id number]            Michelle WOO - Licensed Nurse

## 2023-12-19 NOTE — HOME HEALTH
Routine Visit Note: This patient was sitting in her recliner chair at time of visit. Patient is alert and oirented x 4, responsive and mood stable. Patient's BP was elevated at visit. This nurse inquired if patient had taken AM meds. This patient reported that she had not medications. This nurse instructe to take meds and patient said that she would after our visit. Family also was informed to make sure she takes her meds. Patient otherwise is doing well. Patient is wearing oxygen at visit. Patient desires to get off oxygen. Patient has clear breath sounds and at visit HR rhythm was slighty irregular. This nurse explained that sometimes COVID causes an irregular heart beat. Patient is to see doctor soon and asked her to have them to assess heart. Patient is continent to B/B. Patient has no skin breakdown. Patient has about 1-2plus edema to ankles only. Patient said that she is off covid precautions.     Skill/education provided: Instructed on med purpose, dose, frequency and side effects. Instructed to elevate feet above heart level. Instructed on following dietary instruction regarding management o DMII and overall heart health. Instructed to limit salt, watch portions, and to avoid anything white (whit sugar/breads.) Instructed on diabetic foot care.    Patient/caregiver response: Patient said she would take her medications. Patient verbalized understanding to COVID precautions.     Plan for next visit: CP assessment, teaching on covid, medication teaching.    Other pertinent info: n/a

## 2023-12-20 ENCOUNTER — HOME CARE VISIT (OUTPATIENT)
Dept: HOME HEALTH SERVICES | Facility: HOME HEALTHCARE | Age: 78
End: 2023-12-20
Payer: MEDICARE

## 2023-12-20 VITALS — HEART RATE: 76 BPM | DIASTOLIC BLOOD PRESSURE: 70 MMHG | OXYGEN SATURATION: 95 % | SYSTOLIC BLOOD PRESSURE: 138 MMHG

## 2023-12-20 PROCEDURE — G0152 HHCP-SERV OF OT,EA 15 MIN: HCPCS

## 2023-12-21 ENCOUNTER — HOME CARE VISIT (OUTPATIENT)
Dept: HOME HEALTH SERVICES | Facility: HOME HEALTHCARE | Age: 78
End: 2023-12-21
Payer: MEDICARE

## 2023-12-21 VITALS
HEART RATE: 90 BPM | RESPIRATION RATE: 18 BRPM | OXYGEN SATURATION: 98 % | TEMPERATURE: 97.8 F | SYSTOLIC BLOOD PRESSURE: 124 MMHG | DIASTOLIC BLOOD PRESSURE: 84 MMHG

## 2023-12-21 PROCEDURE — G0299 HHS/HOSPICE OF RN EA 15 MIN: HCPCS

## 2023-12-21 NOTE — HOME HEALTH
Routine Visit Note: LPN    Skill/education provided:  CP . v/s wdl pt taking v/s daily and keeping a log, lasix no falls reported, educated pt on medications    Patient/caregiver response: pt will continue taking meds as prescribed. pt will elevate legs and ambulate throughout homewith walker.    Plan for next visit: cp assessment    Other pertinent info:

## 2023-12-27 ENCOUNTER — HOME CARE VISIT (OUTPATIENT)
Dept: HOME HEALTH SERVICES | Facility: HOME HEALTHCARE | Age: 78
End: 2023-12-27
Payer: MEDICARE

## 2023-12-27 VITALS
TEMPERATURE: 98.7 F | OXYGEN SATURATION: 96 % | HEART RATE: 78 BPM | SYSTOLIC BLOOD PRESSURE: 132 MMHG | DIASTOLIC BLOOD PRESSURE: 78 MMHG | RESPIRATION RATE: 20 BRPM

## 2023-12-27 VITALS — DIASTOLIC BLOOD PRESSURE: 76 MMHG | OXYGEN SATURATION: 95 % | SYSTOLIC BLOOD PRESSURE: 144 MMHG | HEART RATE: 86 BPM

## 2023-12-27 PROCEDURE — G0299 HHS/HOSPICE OF RN EA 15 MIN: HCPCS

## 2023-12-27 PROCEDURE — G0152 HHCP-SERV OF OT,EA 15 MIN: HCPCS

## 2023-12-27 NOTE — HOME HEALTH
Pt was seen today for a skilled OT session with focus on ADL/IADL retraining, functional mobility/balance/transfer training, and UE strength/endurance training. Pt demo. good motivation/participation and made good progress with all goals. Continue OT POC.

## 2024-01-01 ENCOUNTER — HOME CARE VISIT (OUTPATIENT)
Dept: HOME HEALTH SERVICES | Facility: HOME HEALTHCARE | Age: 79
End: 2024-01-01
Payer: MEDICARE

## 2024-01-01 VITALS
RESPIRATION RATE: 18 BRPM | OXYGEN SATURATION: 98 % | HEART RATE: 79 BPM | DIASTOLIC BLOOD PRESSURE: 74 MMHG | TEMPERATURE: 97.3 F | SYSTOLIC BLOOD PRESSURE: 138 MMHG

## 2024-01-01 PROCEDURE — G0299 HHS/HOSPICE OF RN EA 15 MIN: HCPCS

## 2024-01-02 NOTE — HOME HEALTH
Routine Visit Note: Patient is alert and oriented x 4, responsive, and mood stable. VSS. Has clear breath sounds and regular heart beat. Patient no longer requires oxygen during the day and reports that her MD said to wear it overnight. Patient reports no falls. Reports having pain to the outer aspect of thighs that is constant and has been as high as 10/10. Patient desires pain to be 5/10. Patient has no skin breakdown. Patient reports taking medictaions as ordered.     Skill/education provided: SN instructed on med purpose, dose, frequency and side effects. Instructed on eraly signs of PNA which included persistent cough, sputum production with change in color, shortness of breath, shallow breathing, sharp chest pains, low energy and fatigue.    Patient/caregiver response: Patient verbalized understanding medication compliance and early s/s of PNA.    Plan for next visit: CP assessment, medication teaching, and teaching on PNA    Other pertinent info: n/a

## 2024-01-09 ENCOUNTER — HOME CARE VISIT (OUTPATIENT)
Dept: HOME HEALTH SERVICES | Facility: HOME HEALTHCARE | Age: 79
End: 2024-01-09
Payer: MEDICARE

## 2024-01-09 PROCEDURE — G0152 HHCP-SERV OF OT,EA 15 MIN: HCPCS

## 2024-01-10 ENCOUNTER — HOME CARE VISIT (OUTPATIENT)
Dept: HOME HEALTH SERVICES | Facility: HOME HEALTHCARE | Age: 79
End: 2024-01-10
Payer: MEDICARE

## 2024-01-10 VITALS — OXYGEN SATURATION: 96 % | DIASTOLIC BLOOD PRESSURE: 80 MMHG | SYSTOLIC BLOOD PRESSURE: 130 MMHG | HEART RATE: 67 BPM

## 2024-01-10 PROCEDURE — G0299 HHS/HOSPICE OF RN EA 15 MIN: HCPCS

## 2024-01-10 NOTE — HOME HEALTH
Pt was seen today for a skilled OT session with focus on ADL/IADL retraining, functional mobility/balance/transfer training, and UE strength/endurance training. Pt demo. good motivation/participation and made good progress with all goals. Continue OT POC. (1) Other Medications/None

## 2024-01-11 VITALS
SYSTOLIC BLOOD PRESSURE: 140 MMHG | HEART RATE: 75 BPM | DIASTOLIC BLOOD PRESSURE: 80 MMHG | RESPIRATION RATE: 18 BRPM | TEMPERATURE: 97.5 F | OXYGEN SATURATION: 97 %

## 2024-01-12 ENCOUNTER — HOME CARE VISIT (OUTPATIENT)
Dept: HOME HEALTH SERVICES | Facility: HOME HEALTHCARE | Age: 79
End: 2024-01-12
Payer: MEDICARE

## 2024-01-12 VITALS
TEMPERATURE: 97.5 F | HEART RATE: 73 BPM | DIASTOLIC BLOOD PRESSURE: 78 MMHG | OXYGEN SATURATION: 96 % | RESPIRATION RATE: 16 BRPM | SYSTOLIC BLOOD PRESSURE: 130 MMHG

## 2024-01-12 PROCEDURE — G0151 HHCP-SERV OF PT,EA 15 MIN: HCPCS

## 2024-01-12 NOTE — HOME HEALTH
Routine Visit Note: Patient is alert and oriented x 4, responsive, and mood stable. VSS. Has clear breath sounds and regular heart beat. Patient no longer requires oxygen during the day and reports that her MD said to wear it overnight. Patient reports no falls. Reports having pain to the outer aspect of thighs that has improved some. Patient has no skin breakdown. Patient reports taking medictaions as ordered.   Skill/education provided: SN instructed on med purpose, dose, frequency and side effects. Instructed on eraly signs of PNA which included persistent cough, sputum production with change in color, shortness of breath, shallow breathing, sharp chest pains, low energy and fatigue.   Patient/caregiver response: Patient verbalized understanding medication compliance and early s/s of PNA.   Plan for next visit: Agency discharge as PNA has resolved  Other pertinent info: n/a

## 2024-01-12 NOTE — Clinical Note
"Physical Therapy Initial Evaluation:    Patient Presentation and Current Problem(s): Ms. Mathew Crabtree is a 77 y/o female who is evaluated today by PT for HH care planning. Pt is home following complex illness requiring prolonged hospitaliztion.    Hospitalized at [facility] 12/7/23 to 12/13/23 due to COVID-19 infection.  [facility] 11/16/23 to 12/7/23.  Hospitalized at [facility] 10/21/23 to 11/16/23 due to SBO/necrosis, surgery for lysis of adhesions, followed by FRAN requiring inpatient hemodialysis. Her recovery was complicated by FRAN requiring inpt dialysis, Pneumonia with resp failure requiring intubation and ICU care.    Past Medical History: Diabetes, Hypothyroidism, HTN, Lower Lumbar discectomies, Chronic back pain.    Prior Level of Funtion: Ambulating with cane, able to stand and cook a meal, driving, I with bathing, dressing, personal hygiene, able to clean her home and go to the grocery store without assistance.    Current Level of Function: Needs Rollator walker and assistance for in home mobility, unable to drive or clean her home, lacks activity tolerance for prolonged standing activity. Needs assistance for meal preparation.    Patient's Goals for PT: \"return to how I was before this illness, not have to use a walker, be able to cook and keep house, resume driving\"    Weight Bearing Status and Activity Precautions: FWB, Cardiopulmonary and Fall Precautions  Home environment/Caregiver Status: Pt lives in a 1 level home. A friend lives with pt but he works during the day.  DME: Rollator walker, O2, bath chair, cane, glucometer and supplies.  Code Status: CPR  Fall Risk: High    HH-PT Focus of Care: Debility related to prolonged/complex illness including COVID-19, SBO w/necrosis, FRAN, and pneumonia. PT will focus on Rehabilitation of pt from CLOF towards her reported PLOF in keeping with pt's stated functional goals.    Planned PT Interventions: therapeutic activities, gait training, " therapeutic exercise, transfer training, neuromuscular re-education, pt/CG education (HEP, symptom management, fall risk reduction), assist with DME as needed for safe mobility.    PT Frequency and Duration: 1w1, 2w3, 1w1 beginning today. Pt is agreeable with this plan of care.    Plan of Care Reviewed with:  Care Team and Cmai Rosario MD

## 2024-01-12 NOTE — HOME HEALTH
"Physical Therapy Initial Eval:    Prior Level of Funtion: Ambulating with cane, able to stand and cook a meal, driving, I with bathing, dressing, personal hygiene, able to clean her home, able to go to the grocery store without difficulty  Current Level of Function: Needs Rollator walker and assistance for in home mobility, unable to drive or clean her home, lacks activity tolerance for prolonged standing activity.  Patient's Goals for  PT: \"return to how I was before this illness, not have to use a walker, be able to cook and keep house, resume driving\"  SOC Note: Hospitalized 12. 7 - 12/13 at [facility] [place] by [facility] with COVID. Was transferred from the [facility] where she had resided 11/16 through 12/7.   Home Health ordered for: disciplines SN, PT, OT   Reason for Hosp/Primary Dx/Co-morbidities: COVID Hx of Diabetes, Hypothyroidism, HTN, Prior to this hospitalization she was at [facility] 10/21 [facility] 11/16 due to concerns about SBO/necrosis. Underwent surgery for lysis of adhesions. Postop she develped FRAN and required hemodialysis inpatient, had a SBO without need for surgery, Pneumonia and resp failure which required ICU and intubation. Surgical history: Lower Lumbar discectomies, Chronic back pain. B LE numbness and tingling.  Focus of Care: Respiratory assessments, new oxgyen education due to dx of COVID. Saf ety education, and medicaiton eduction. Current health to be set up and instructed on.   Patient's goal(s):\" To get well and stay out of the hospital\"   Current Functional status/mobility/DME: Very weak and requries use of walker and or cane. House is very cluttered and there are fall concners, lyndon with introduction of new oxgyen tubing.   HB status/Living Arrangements: Requires moderate assist of 1 to 2 persons , use of walker and freu qent rest periods in order to leave home due to dyspnea with mild exertion, leg pain, and weakness.   Skin Integrity/wound status: skin intact with no open areas noted. Prem score of " 18   Code Status: Full Code   Fall Risk/Safety concerns: Mahc score of 6   Educated on Emergency Plan, steps to take prior to going to the ER and when to Call [facility]: Yes, magnet with office number put on fridge along with homecare calendar Oxy gen safety educated on and contacted DME due to absence of medical alert of oxgyen in the home.   Medication issues/Concerns: Contacted PCP regarding lasix. Called Dr Cami Naidu and spoke with nurse in office. Pt sent home with Laix 40mg on hospital discahrge list but was not given a script and has no lasix in the home. Pt was taking HTCZ 25mg daily prior to hospitalization but it is not on the med list   Additional Problems/Conc erns: COVID dx 12/ 7/23 finishes dexamethasone on 12/ 16/23   SDOH Barriers (i.e. caregiver concerns, social isolation, transportation, food insecurity, environment, income etc.)/Need for MSW: offered MSW for assist with LW and HCPA pt declined   Plan for next visit: Respiratory assessment, set up current health and instruct pt, followup on lasix issue with Dr Gonzalez.

## 2024-01-15 ENCOUNTER — HOME CARE VISIT (OUTPATIENT)
Dept: HOME HEALTH SERVICES | Facility: HOME HEALTHCARE | Age: 79
End: 2024-01-15
Payer: MEDICARE

## 2024-01-15 PROCEDURE — G0151 HHCP-SERV OF PT,EA 15 MIN: HCPCS

## 2024-01-16 ENCOUNTER — HOME CARE VISIT (OUTPATIENT)
Dept: HOME HEALTH SERVICES | Facility: HOME HEALTHCARE | Age: 79
End: 2024-01-16
Payer: MEDICARE

## 2024-01-16 VITALS
HEART RATE: 82 BPM | SYSTOLIC BLOOD PRESSURE: 136 MMHG | DIASTOLIC BLOOD PRESSURE: 80 MMHG | TEMPERATURE: 96.8 F | OXYGEN SATURATION: 98 % | RESPIRATION RATE: 18 BRPM

## 2024-01-16 PROCEDURE — G0299 HHS/HOSPICE OF RN EA 15 MIN: HCPCS

## 2024-01-16 PROCEDURE — G0155 HHCP-SVS OF CSW,EA 15 MIN: HCPCS

## 2024-01-16 PROCEDURE — G0152 HHCP-SERV OF OT,EA 15 MIN: HCPCS

## 2024-01-16 NOTE — HOME HEALTH
Met with Ms. Mcdonough who lives with a male roommate.  She said she has lived in her current home for 30 years and almost has her home paid off.  She is a retired nurse.  She states she is feeling much better and is hoping to get her strength back in working with HH OT and PT.  She said she wants to complete a living will today and appoint her niece as healthcare surrogate.  We called her niece, Gabriela Castaneda, [phone], [address], [place] who was agreeable to be the primary healthcare surrogate.  She appointed her other niece, Helga Oliver, as the secondary healthcare surrogate.  Document completed and notarized.  Copy made and scanned into patient's chart.  Patient has the original and will take to her MD for them to scan into their system.  She said most of her doctors are at [facility] and [facility].  I also gave her resources and a [employer] resource book.  She denies further concerns at this time.

## 2024-01-18 ENCOUNTER — HOME CARE VISIT (OUTPATIENT)
Dept: HOME HEALTH SERVICES | Facility: HOME HEALTHCARE | Age: 79
End: 2024-01-18
Payer: MEDICARE

## 2024-01-18 VITALS
RESPIRATION RATE: 16 BRPM | OXYGEN SATURATION: 95 % | TEMPERATURE: 97.8 F | SYSTOLIC BLOOD PRESSURE: 125 MMHG | DIASTOLIC BLOOD PRESSURE: 68 MMHG | DIASTOLIC BLOOD PRESSURE: 65 MMHG | HEART RATE: 66 BPM | HEART RATE: 66 BPM | SYSTOLIC BLOOD PRESSURE: 130 MMHG | RESPIRATION RATE: 16 BRPM | TEMPERATURE: 97.7 F | OXYGEN SATURATION: 97 %

## 2024-01-18 VITALS — DIASTOLIC BLOOD PRESSURE: 70 MMHG | SYSTOLIC BLOOD PRESSURE: 124 MMHG | OXYGEN SATURATION: 96 % | HEART RATE: 80 BPM

## 2024-01-18 PROCEDURE — G0151 HHCP-SERV OF PT,EA 15 MIN: HCPCS

## 2024-01-19 NOTE — HOME HEALTH
Routine Visit Note: PT treatment    Skill/education provided: Ther ex instruction, gait training, NMR to improve balance and trunk control. HEP progression    Patient/caregiver response: Pt reports moderate fatigue with ther ex in standing. Limiting facor is her R knee OA and Pain.    Plan for next visit: Progress and modify therapy interventions to help pt reach her functional goals.

## 2024-01-22 NOTE — HOME HEALTH
Routine Visit Note: PT treatment    Skill/education provided: ther ex, cardiopulmonary strategies, transfer training, NMR for balance re-ed. Written HEP provided    Patient/caregiver response: R>L knee pain due to OA during restorator bike activity. PT recommended pt stop peddling secondary to joint discomfort.    Plan for next visit: progress and modify therapy interventions to help pt reach her functional goals    Other pertinent info: PT recommended pt remove unsecured area rugs secondary to tripping hazard.

## 2024-01-23 ENCOUNTER — HOME CARE VISIT (OUTPATIENT)
Dept: HOME HEALTH SERVICES | Facility: HOME HEALTHCARE | Age: 79
End: 2024-01-23
Payer: MEDICARE

## 2024-01-23 PROCEDURE — G0152 HHCP-SERV OF OT,EA 15 MIN: HCPCS

## 2024-01-23 PROCEDURE — G0151 HHCP-SERV OF PT,EA 15 MIN: HCPCS

## 2024-01-24 VITALS
TEMPERATURE: 97.7 F | OXYGEN SATURATION: 97 % | DIASTOLIC BLOOD PRESSURE: 72 MMHG | SYSTOLIC BLOOD PRESSURE: 136 MMHG | HEART RATE: 68 BPM | RESPIRATION RATE: 16 BRPM

## 2024-01-24 VITALS — OXYGEN SATURATION: 96 % | SYSTOLIC BLOOD PRESSURE: 126 MMHG | DIASTOLIC BLOOD PRESSURE: 72 MMHG | HEART RATE: 76 BPM

## 2024-01-24 NOTE — HOME HEALTH
Pt was seen today for a skilled OT session with focus on ADL/IADL retraining, functional mobility/balance/transfer training, and UE strength/endurance training. Pt demo good motivation/participation and made good progress with all goals. Continue OT POC.

## 2024-01-25 ENCOUNTER — HOME CARE VISIT (OUTPATIENT)
Dept: HOME HEALTH SERVICES | Facility: HOME HEALTHCARE | Age: 79
End: 2024-01-25
Payer: MEDICARE

## 2024-01-25 PROCEDURE — G0151 HHCP-SERV OF PT,EA 15 MIN: HCPCS

## 2024-01-25 NOTE — Clinical Note
Physical Therapy Reassessment:    Summary of Care: Ms. Mathew Crabtree and her CG have particpated in 5 PT treatment encounters since her initial evaluation 1/12/24. PT focus of care has been on pt's Debility related to prolonged/complex illness including COVID-19, SBO w/necrosis, FRAN, and pneumonia. She also has pain limited function secondary to R knee OA, trunk and B hip weakness.  PT Interventions have included: therapeutic activities, gait training, therapeutic exercise, transfer training, neuromuscular re-education, pt/CG education (HEP, symptom management, fall risk reduction), training with DME as needed for safe mobility.     Progress thus far: Pt is participating in HEP. She is beginning to walk with SPC on L side instead of Rolling walker. She is needing less assistance with transfers and ambulation while walking more frequently at home.    Patient/CG Participation: Good. Pt is motivated to improve.    Remaining Problems: High fall risk, difficulty walking, R knee OA and pain, functional muscle weakness especially in hips and trunk, limited activity tolerance, pt has numerous area rugs which are a tripping hazard.    Skill/education provided today: Ther ex instruction for hip and trunk strengthening and balance, gait training using SPC.  Patient/caregiver response: R knee pain limiting some activities. Pt reports improvement in activity tolerance.  Plan for next visit: Progress therapy interventions and HEP to  help pt reach her functional goals    Plan of Care: Continue PT 2w1, 1w1 beginning 1/28/24. Anticipate Recert for PT on or just before 2/11/24. Pt is agreeable with this plan of care.     Reviewed with:  Care Team and routed to Cami Rosario MD

## 2024-01-28 VITALS
HEART RATE: 72 BPM | RESPIRATION RATE: 16 BRPM | OXYGEN SATURATION: 97 % | DIASTOLIC BLOOD PRESSURE: 74 MMHG | TEMPERATURE: 97.6 F | SYSTOLIC BLOOD PRESSURE: 138 MMHG

## 2024-01-28 NOTE — HOME HEALTH
Routine Visit Note: PT Treatment    Skill/education provided: Hamstring ITB stretch for lateral thigh pain, HEP instruction, strengthening and balance ther ex, initiated gait training with cane after adjusting to the [id number] height.. Redirected pt to use cane on L instead of R in order to offload R knee.     Patient/caregiver response: Pt took time to adjust to use of cane in L hand but also reports decreased R knee pain when walking this way.    Plan for next visit: Modify and progress therapy interventions to help pt reach her functional goals

## 2024-01-28 NOTE — HOME HEALTH
Physical Therapy Reassessment:    Summary of Care: Ms. Mathew Crabtree and her CG have particpated in 5 PT treatment encounters since her initial evaluation 1/12/24. PT focus of care has been on pt's Debility related to prolonged/complex illness including COVID-19, SBO w/necrosis, FRAN, and pneumonia. She also has pain limited function secondary to R knee OA, trunk and B hip weakness.  PT Interventions have included: therapeutic activities, gait training, therapeutic exercise, transfer training, neuromuscular re-education, pt/CG education (HEP, symptom management, fall risk reduction), training with DME as needed for safe mobility.     Progress thus far: Pt is participating in HEP. She is beginning to walk with SPC on L side instead of Rolling walker. She is needing less assistance with transfers and ambulation while walking more frequently at home.    Patient/CG Participation: Good. Pt is motivated to improve.    Remaining Problems: High fall risk, difficulty walking, R knee OA and pain, functional muscle weakness especially in hips and trunk, limited activity tolerance, pt has numerous area rugs which are a tripping hazard.    Skill/education provided today: Ther ex instruction for hip and trunk strengthening and balance, gait training using SPC.  Patient/caregiver response: R knee pain limiting some activities. Pt reports improvement in activity tolerance.  Plan for next visit: Progress therapy interventions and HEP to  help pt reach her functional goals    Plan of Care: Continue PT 2w1, 1w1 beginning 1/28/24. Anticipate Recert for PT on or just before 2/11/24. Pt is agreeable with this plan of care. Reviewed with:  Care Team and Cami Rosario MD

## 2024-01-30 ENCOUNTER — HOME CARE VISIT (OUTPATIENT)
Dept: HOME HEALTH SERVICES | Facility: HOME HEALTHCARE | Age: 79
End: 2024-01-30
Payer: MEDICARE

## 2024-01-30 PROCEDURE — G0152 HHCP-SERV OF OT,EA 15 MIN: HCPCS

## 2024-01-31 VITALS
HEART RATE: 78 BPM | TEMPERATURE: 97.6 F | DIASTOLIC BLOOD PRESSURE: 74 MMHG | SYSTOLIC BLOOD PRESSURE: 122 MMHG | OXYGEN SATURATION: 96 %

## 2024-02-02 ENCOUNTER — HOME CARE VISIT (OUTPATIENT)
Dept: HOME HEALTH SERVICES | Facility: HOME HEALTHCARE | Age: 79
End: 2024-02-02
Payer: MEDICARE

## 2024-02-02 VITALS
HEART RATE: 67 BPM | OXYGEN SATURATION: 95 % | SYSTOLIC BLOOD PRESSURE: 148 MMHG | TEMPERATURE: 97.8 F | RESPIRATION RATE: 16 BRPM | DIASTOLIC BLOOD PRESSURE: 80 MMHG

## 2024-02-02 PROCEDURE — G0157 HHC PT ASSISTANT EA 15: HCPCS

## 2024-02-03 NOTE — CASE COMMUNICATION
Patient missed a PT home visit from [facility] on 1/30/2024.    Reason: No return call to schedule, mulitple attempts.       For your records only.   Per CMS Guidance, MD must be notified of missed/cancelled visits; therefore the prescribed frequency was not met.

## 2024-02-06 ENCOUNTER — HOME CARE VISIT (OUTPATIENT)
Dept: HOME HEALTH SERVICES | Facility: HOME HEALTHCARE | Age: 79
End: 2024-02-06
Payer: MEDICARE

## 2024-02-06 PROCEDURE — G0151 HHCP-SERV OF PT,EA 15 MIN: HCPCS

## 2024-02-07 ENCOUNTER — HOME CARE VISIT (OUTPATIENT)
Dept: HOME HEALTH SERVICES | Facility: HOME HEALTHCARE | Age: 79
End: 2024-02-07
Payer: MEDICARE

## 2024-02-07 VITALS
HEART RATE: 70 BPM | OXYGEN SATURATION: 97 % | TEMPERATURE: 97.8 F | DIASTOLIC BLOOD PRESSURE: 76 MMHG | RESPIRATION RATE: 16 BRPM | SYSTOLIC BLOOD PRESSURE: 134 MMHG

## 2024-02-07 PROCEDURE — G0152 HHCP-SERV OF OT,EA 15 MIN: HCPCS

## 2024-02-08 VITALS
HEART RATE: 64 BPM | SYSTOLIC BLOOD PRESSURE: 132 MMHG | DIASTOLIC BLOOD PRESSURE: 78 MMHG | OXYGEN SATURATION: 97 % | TEMPERATURE: 97.7 F

## 2024-02-09 ENCOUNTER — HOME CARE VISIT (OUTPATIENT)
Dept: HOME HEALTH SERVICES | Facility: HOME HEALTHCARE | Age: 79
End: 2024-02-09
Payer: MEDICARE

## 2024-02-09 VITALS
RESPIRATION RATE: 16 BRPM | DIASTOLIC BLOOD PRESSURE: 78 MMHG | TEMPERATURE: 97.5 F | SYSTOLIC BLOOD PRESSURE: 132 MMHG | HEART RATE: 74 BPM | OXYGEN SATURATION: 97 %

## 2024-02-09 PROCEDURE — G0151 HHCP-SERV OF PT,EA 15 MIN: HCPCS

## 2024-02-09 NOTE — HOME HEALTH
60 Day Summary: pt has been seen for 7 skilled HHPT visits since initial PT eval with focus of care on debility related to prolonged/complex illness including COVID-19, SBO w/necrosis, FRAN, and pneumonia as well as pain and limited function secondary to R knee OA and decreased strength in core and BLE  Home Health need continues for: Physical Therapy  Primary diagnoses/co-morbidities/recent procedures in past 60 days that impact current episode: COVID 19, R knee OA  Current level of functional ability: pt currently ambulates with FWW in home with SBA/CGA, requires SBA for safe functional transfers  Homebound status and living arrangements: pt lives with significant other in a one story home  Goals accomplished and/or measurable progress toward unmet goals in past 60 days: pt demonstrates improved gait with FWW from requiring mod A to current SBA/CGA, improved balance with Tinetti improved from 15/28 to 19/28; improved LE strength with 30 sec STS improved from 3 to 4  Focus of care for next 60 days for each discipline ordered: Focus of care for PT to include 1wk5 for gait training (pt would like to be able to use SPC instead of FWW), therapeutic exercise, therapeutic activities, pt education and HEP instruction related to ARF, Covid 19 and R knee OA  Skin integrity/wound status: no deficits noted  Estimated date when home care services will end: 4/11/24  SDOH changes/barriers (i.e. Caregiver availability, social isolation, environment, income, transportation access, food insecurity etc.): n/a  Need for MSW referral? no  Plan for next visit: upgrade HEP and initiate gait with SPC

## 2024-02-09 NOTE — CASE COMMUNICATION
Please send to Cami Gonzalez MD; [facility] [address]; phone: [phone]; Fax: [phone]; NPI: [id number]  60 Day Summary: pt has been seen for 7 skilled HHPT visits since initial PT eval with focus of care on debility related to prolonged/complex illness including COVID-19, SBO w/necrosis, FRAN, and pneumonia as well as pain and limited function secondary to R knee OA and decreased strength in core and BL E  Home Health need continues for: Physical Therapy  Primary diagnoses/co-morbidities/recent procedures in past 60 days that impact current episode: COVID 19, R knee OA  Current level of functional ability: pt currently ambulates with FWW in home with SBA/CGA, requires SBA for safe functional transfers  Homebound status and living arrangements: pt lives with significant other in a one story home  Goals accomplished and/or measurable pro jorge toward unmet goals in past 60 days: pt demonstrates improved gait with FWW from requiring mod A to current SBA/CGA, improved balance with Tinetti improved from 15/28 to 19/28; improved LE strength with 30 sec STS improved from 3 to 4  Focus of care for next 60 days for each discipline ordered: Focus of care for PT to include 1wk5 for gait training (pt would like to be able to use SPC instead of FWW), therapeutic exercise, therapeu tic activities, pt education and HEP instruction   Skin integrity/wound status: no deficits noted  Estimated date when home care services will end: 4/11/24  SDOH changes/barriers (i.e. Caregiver availability, social isolation, environment, income, transportation access, food insecurity etc.): n/a  Need for MSW referral? no  Plan for next visit: upgrade HEP and initiate gait with SPC

## 2024-02-14 ENCOUNTER — HOME CARE VISIT (OUTPATIENT)
Dept: HOME HEALTH SERVICES | Facility: HOME HEALTHCARE | Age: 79
End: 2024-02-14
Payer: MEDICARE

## 2024-02-14 VITALS
HEART RATE: 74 BPM | SYSTOLIC BLOOD PRESSURE: 145 MMHG | OXYGEN SATURATION: 97 % | RESPIRATION RATE: 16 BRPM | DIASTOLIC BLOOD PRESSURE: 80 MMHG | TEMPERATURE: 98.4 F

## 2024-02-14 PROCEDURE — G0157 HHC PT ASSISTANT EA 15: HCPCS

## 2024-02-21 ENCOUNTER — HOME CARE VISIT (OUTPATIENT)
Dept: HOME HEALTH SERVICES | Facility: HOME HEALTHCARE | Age: 79
End: 2024-02-21
Payer: MEDICARE

## 2024-02-21 PROCEDURE — G0157 HHC PT ASSISTANT EA 15: HCPCS

## 2024-02-22 VITALS
HEART RATE: 69 BPM | RESPIRATION RATE: 16 BRPM | DIASTOLIC BLOOD PRESSURE: 78 MMHG | TEMPERATURE: 97.6 F | OXYGEN SATURATION: 94 % | SYSTOLIC BLOOD PRESSURE: 149 MMHG

## 2024-02-28 ENCOUNTER — HOME CARE VISIT (OUTPATIENT)
Dept: HOME HEALTH SERVICES | Facility: HOME HEALTHCARE | Age: 79
End: 2024-02-28
Payer: MEDICARE

## 2024-02-28 PROCEDURE — G0157 HHC PT ASSISTANT EA 15: HCPCS

## 2024-02-29 VITALS
OXYGEN SATURATION: 95 % | HEART RATE: 65 BPM | DIASTOLIC BLOOD PRESSURE: 78 MMHG | RESPIRATION RATE: 16 BRPM | SYSTOLIC BLOOD PRESSURE: 148 MMHG | TEMPERATURE: 97.8 F

## 2024-03-07 ENCOUNTER — HOME CARE VISIT (OUTPATIENT)
Dept: HOME HEALTH SERVICES | Facility: HOME HEALTHCARE | Age: 79
End: 2024-03-07
Payer: MEDICARE

## 2024-03-07 PROCEDURE — G0151 HHCP-SERV OF PT,EA 15 MIN: HCPCS

## 2024-03-08 VITALS
RESPIRATION RATE: 17 BRPM | OXYGEN SATURATION: 96 % | HEART RATE: 74 BPM | DIASTOLIC BLOOD PRESSURE: 68 MMHG | SYSTOLIC BLOOD PRESSURE: 144 MMHG | TEMPERATURE: 97.8 F